# Patient Record
Sex: FEMALE | Race: WHITE
[De-identification: names, ages, dates, MRNs, and addresses within clinical notes are randomized per-mention and may not be internally consistent; named-entity substitution may affect disease eponyms.]

---

## 2017-06-12 ENCOUNTER — HOSPITAL ENCOUNTER (OUTPATIENT)
Dept: HOSPITAL 75 - RAD | Age: 47
End: 2017-06-12
Attending: NURSE PRACTITIONER
Payer: COMMERCIAL

## 2017-06-12 DIAGNOSIS — I10: Primary | ICD-10-CM

## 2017-06-14 ENCOUNTER — HOSPITAL ENCOUNTER (OUTPATIENT)
Dept: HOSPITAL 75 - RAD | Age: 47
End: 2017-06-14
Attending: NURSE PRACTITIONER
Payer: COMMERCIAL

## 2017-06-14 DIAGNOSIS — I10: Primary | ICD-10-CM

## 2017-06-14 PROCEDURE — 93975 VASCULAR STUDY: CPT

## 2017-06-18 ENCOUNTER — HOSPITAL ENCOUNTER (OUTPATIENT)
Dept: HOSPITAL 75 - ER | Age: 47
Setting detail: OBSERVATION
LOS: 2 days | Discharge: HOME | End: 2017-06-20
Attending: FAMILY MEDICINE | Admitting: FAMILY MEDICINE
Payer: COMMERCIAL

## 2017-06-18 VITALS — WEIGHT: 194 LBS | BODY MASS INDEX: 28.73 KG/M2 | HEIGHT: 69 IN

## 2017-06-18 VITALS — SYSTOLIC BLOOD PRESSURE: 138 MMHG | DIASTOLIC BLOOD PRESSURE: 80 MMHG

## 2017-06-18 VITALS — SYSTOLIC BLOOD PRESSURE: 166 MMHG | DIASTOLIC BLOOD PRESSURE: 77 MMHG

## 2017-06-18 VITALS — DIASTOLIC BLOOD PRESSURE: 79 MMHG | SYSTOLIC BLOOD PRESSURE: 158 MMHG

## 2017-06-18 VITALS — DIASTOLIC BLOOD PRESSURE: 71 MMHG | SYSTOLIC BLOOD PRESSURE: 136 MMHG
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
## 2017-06-18 DIAGNOSIS — S00.81XA: ICD-10-CM

## 2017-06-18 DIAGNOSIS — I10: ICD-10-CM

## 2017-06-18 DIAGNOSIS — S00.31XA: ICD-10-CM

## 2017-06-18 DIAGNOSIS — S01.512A: ICD-10-CM

## 2017-06-18 DIAGNOSIS — M79.7: ICD-10-CM

## 2017-06-18 DIAGNOSIS — R06.09: ICD-10-CM

## 2017-06-18 DIAGNOSIS — Z23: ICD-10-CM

## 2017-06-18 DIAGNOSIS — S13.4XXA: ICD-10-CM

## 2017-06-18 DIAGNOSIS — S00.512A: ICD-10-CM
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
## 2017-06-18 DIAGNOSIS — F17.210: ICD-10-CM

## 2017-06-18 DIAGNOSIS — R55: Primary | ICD-10-CM

## 2017-06-18 DIAGNOSIS — F41.9: ICD-10-CM

## 2017-06-18 LAB
ALBUMIN SERPL-MCNC: 4.4 GM/DL (ref 3.2–4.5)
ALT SERPL-CCNC: 12 U/L (ref 0–55)
ANION GAP SERPL CALC-SCNC: 14 MMOL/L (ref 5–14)
APTT BLD: 26 SEC (ref 24–35)
AST SERPL-CCNC: 20 U/L (ref 5–34)
BASOPHILS # BLD AUTO: 0 10^3/UL (ref 0–0.1)
BASOPHILS NFR BLD AUTO: 0 % (ref 0–10)
BILIRUB SERPL-MCNC: 0.5 MG/DL (ref 0.1–1)
BILIRUB UR QL STRIP: NEGATIVE
BUN SERPL-MCNC: 18 MG/DL (ref 7–18)
BUN/CREAT SERPL: 16 (ref 0–20)
CALCIUM SERPL-MCNC: 9.2 MG/DL (ref 8.5–10.1)
CHLORIDE SERPL-SCNC: 101 MMOL/L (ref 98–107)
CK SERPL-CCNC: 87 U/L (ref 29–168)
CO2 SERPL-SCNC: 22 MMOL/L (ref 21–32)
CREAT SERPL-MCNC: 1.11 MG/DL (ref 0.6–1.3)
EOSINOPHIL # BLD AUTO: 0.1 10^3/UL (ref 0–0.3)
EOSINOPHIL NFR BLD AUTO: 1 % (ref 0–10)
ERYTHROCYTE [DISTWIDTH] IN BLOOD BY AUTOMATED COUNT: 12.9 % (ref 10–14.5)
GFR SERPLBLD BASED ON 1.73 SQ M-ARVRAT: 52 ML/MIN
GLUCOSE SERPL-MCNC: 121 MG/DL (ref 70–105)
HEMOLYSIS: 12 (ref -100–29)
ICTERUS: 0.5 (ref -100–1.9)
INR PPP: 1 (ref 0.8–1.4)
KETONES UR QL STRIP: NEGATIVE
LEUKOCYTE ESTERASE UR QL STRIP: NEGATIVE
LIPEMIA: 1 (ref -100–49)
LYMPHOCYTES # BLD AUTO: 4.3 X 10^3 (ref 1–4)
LYMPHOCYTES NFR BLD AUTO: 47 % (ref 12–44)
MAGNESIUM SERPL-MCNC: 2.3 MG/DL (ref 1.8–2.4)
MCH RBC QN AUTO: 30 PG (ref 25–34)
MCHC RBC AUTO-ENTMCNC: 33 G/DL (ref 32–36)
MCV RBC AUTO: 90 FL (ref 80–99)
MONOCYTES # BLD AUTO: 0.6 X 10^3 (ref 0–1)
MONOCYTES NFR BLD AUTO: 7 % (ref 0–12)
NEUTROPHILS # BLD AUTO: 4.2 X 10^3 (ref 1.8–7.8)
NEUTROPHILS NFR BLD AUTO: 45 % (ref 42–75)
NITRITE UR QL STRIP: NEGATIVE
PH UR STRIP: 6.5 [PH] (ref 5–9)
PLATELET # BLD: 186 10^3/UL (ref 130–400)
PMV BLD AUTO: 12.7 FL (ref 7.4–10.4)
POTASSIUM SERPL-SCNC: 3.7 MMOL/L (ref 3.6–5)
PROT SERPL-MCNC: 7.2 GM/DL (ref 6.4–8.2)
PROT UR QL STRIP: (no result)
PROTHROMBIN TIME: 12.9 SEC (ref 12.2–14.7)
RBC # BLD AUTO: 4.63 10^6/UL (ref 4.35–5.85)
SODIUM SERPL-SCNC: 137 MMOL/L (ref 135–145)
SP GR UR STRIP: 1.01 (ref 1.02–1.02)
SQUAMOUS #/AREA URNS HPF: (no result) /HPF
TROPONIN I SERPL-MCNC: < 0.3 NG/ML (ref ?–0.3)
UROBILINOGEN UR-MCNC: NORMAL MG/DL
WBC # BLD AUTO: 9.3 10^3/UL (ref 4.3–11)
WBC #/AREA URNS HPF: (no result) /HPF

## 2017-06-18 PROCEDURE — 72128 CT CHEST SPINE W/O DYE: CPT

## 2017-06-18 PROCEDURE — 82553 CREATINE MB FRACTION: CPT

## 2017-06-18 PROCEDURE — 85025 COMPLETE CBC W/AUTO DIFF WBC: CPT

## 2017-06-18 PROCEDURE — 75635 CT ANGIO ABDOMINAL ARTERIES: CPT

## 2017-06-18 PROCEDURE — 83735 ASSAY OF MAGNESIUM: CPT

## 2017-06-18 PROCEDURE — 84443 ASSAY THYROID STIM HORMONE: CPT

## 2017-06-18 PROCEDURE — 93880 EXTRACRANIAL BILAT STUDY: CPT

## 2017-06-18 PROCEDURE — 93041 RHYTHM ECG TRACING: CPT

## 2017-06-18 PROCEDURE — 70450 CT HEAD/BRAIN W/O DYE: CPT

## 2017-06-18 PROCEDURE — 80053 COMPREHEN METABOLIC PANEL: CPT

## 2017-06-18 PROCEDURE — 72170 X-RAY EXAM OF PELVIS: CPT

## 2017-06-18 PROCEDURE — 93005 ELECTROCARDIOGRAM TRACING: CPT

## 2017-06-18 PROCEDURE — 90715 TDAP VACCINE 7 YRS/> IM: CPT

## 2017-06-18 PROCEDURE — 83874 ASSAY OF MYOGLOBIN: CPT

## 2017-06-18 PROCEDURE — 85730 THROMBOPLASTIN TIME PARTIAL: CPT

## 2017-06-18 PROCEDURE — 82550 ASSAY OF CK (CPK): CPT

## 2017-06-18 PROCEDURE — 84484 ASSAY OF TROPONIN QUANT: CPT

## 2017-06-18 PROCEDURE — 72131 CT LUMBAR SPINE W/O DYE: CPT

## 2017-06-18 PROCEDURE — 72125 CT NECK SPINE W/O DYE: CPT

## 2017-06-18 PROCEDURE — 93306 TTE W/DOPPLER COMPLETE: CPT

## 2017-06-18 PROCEDURE — 36415 COLL VENOUS BLD VENIPUNCTURE: CPT

## 2017-06-18 PROCEDURE — 81000 URINALYSIS NONAUTO W/SCOPE: CPT

## 2017-06-18 PROCEDURE — 71010: CPT

## 2017-06-18 PROCEDURE — 70486 CT MAXILLOFACIAL W/O DYE: CPT

## 2017-06-18 PROCEDURE — 83880 ASSAY OF NATRIURETIC PEPTIDE: CPT

## 2017-06-18 PROCEDURE — 85610 PROTHROMBIN TIME: CPT

## 2017-06-18 NOTE — DIAGNOSTIC IMAGING REPORT
INDICATION: Passed out, fell down. Pain



EXAMINATION: Chest 6/18/17



FINDINGS:



The osseous structures are overall somewhat sclerotic in

appearance but fairly homogeneous; correlate clinically. There

are no fractures appreciated. The lungs are clear. No infiltrates

or effusions are seen and there is no pneumothorax. Heart and

pulmonary vasculature unremarkable.



IMPRESSION:

1. No acute process.



Dictated by: 



  Dictated on workstation # [id number]

## 2017-06-18 NOTE — DIAGNOSTIC IMAGING REPORT
INDICATION: Fell today. No complaints of back pain



EXAMINATION: CT thoracic and lumbar spine dated 6/18/2017



FINDINGS:



There is normal height and alignment of the vertebral bodies

throughout the thoracic and lumbar spine. No subluxations

appreciated. The canal centrally appears to be patent although

limited on CT imaging. Several areas of linear lucencies noted

scattered throughout the posterior elements of the thoracic and

lumbar spine are felt to represent areas of prominent nutrient

foramina. No displaced fractures are appreciated.



Within the lower lumbar region fairly marked multilevel facet

hypertrophy noted. There is sclerosis bilaterally at the SI

joints. Multilevel likely central narrowing seen in the lower

lumbar region but does not appear significant. This could be

better characterized with MRI as clinically warranted. The

visualized intra-abdominal structures poorly characterized due to

the bone algorithm technique. Prominence of the left renal pelvic

region likely due to peripelvic cysts. The visualized lungs

demonstrate no evidence for acute disease.



IMPRESSION:

1. Multilevel degenerative findings but no acute fractures are

appreciated. If pain persists, MRI recommended. Other incidental

findings as noted above.



Dictated by: 



  Dictated on workstation # [id number]

## 2017-06-18 NOTE — DIAGNOSTIC IMAGING REPORT
INDICATION: Fell down stairs, pain. 



EXAMINATION: Pelvis, 6/18/2017.



FINDINGS: There is no evidence for an acute fracture or

dislocation. The joint spaces are well maintained. There is no

significant soft tissue swelling.



IMPRESSION: No acute process.



Dictated by: 



  Dictated on workstation # [id number]

## 2017-06-18 NOTE — DIAGNOSTIC IMAGING REPORT
PROCEDURE: CT head, face, and cervical spine without contrast.



TECHNIQUE: Multiple contiguous axial images were obtained through

the head, neck, and facial bones without the use of intravenous

contrast. Sagittal and coronal reformations through the cervical

spine and facial bones were also performed.



INDICATION:  Passed out and fell down stairs. Pain and laceration

to left chin and upper lip area.



Brain:



No hemorrhage or infarct is seen. No mass, mass effect or midline

shift is noted and there is no hydrocephalus. The calvarium is

intact. Paranasal sinuses demonstrate no evidence for acute

disease.



CT cervical spine:



There is straightening of the normal curvature, otherwise normal

alignment seen. No compression deformities appreciated. No

fractures visualized. The prevertebral soft tissues demonstrate

no evidence for acute disease. The lung apices are grossly

unremarkable.



IMPRESSION: 

1. Degenerative findings throughout the cervical spine but no

acute disease appreciated.

2. Negative head CT.



CT maxillofacial:



Diffuse mucosal thickening seen throughout the sinuses. No

air-fluid levels appreciated. No displaced fractures are seen.

Nasal bones are fairly symmetric bilaterally. Mild soft tissue

prominence overlying the chin on the left side likely known

injury and secondary hematoma. No adjacent fractures are

appreciated. No sagittal reconstructed imaging is provided.



IMPRESSION:

1. Chronic change within the osseous structures. Soft tissue

abnormality as noted above adjacent to the left aspect of the

chin but no acute fractures are appreciated.



Dictated by: 



  Dictated on workstation # [id number]

## 2017-06-19 VITALS — DIASTOLIC BLOOD PRESSURE: 88 MMHG | SYSTOLIC BLOOD PRESSURE: 136 MMHG

## 2017-06-19 VITALS — DIASTOLIC BLOOD PRESSURE: 77 MMHG | SYSTOLIC BLOOD PRESSURE: 117 MMHG

## 2017-06-19 VITALS — SYSTOLIC BLOOD PRESSURE: 130 MMHG | DIASTOLIC BLOOD PRESSURE: 75 MMHG

## 2017-06-19 VITALS — SYSTOLIC BLOOD PRESSURE: 147 MMHG | DIASTOLIC BLOOD PRESSURE: 81 MMHG

## 2017-06-19 VITALS — SYSTOLIC BLOOD PRESSURE: 127 MMHG | DIASTOLIC BLOOD PRESSURE: 70 MMHG

## 2017-06-19 VITALS — SYSTOLIC BLOOD PRESSURE: 121 MMHG | DIASTOLIC BLOOD PRESSURE: 69 MMHG

## 2017-06-19 VITALS — SYSTOLIC BLOOD PRESSURE: 148 MMHG | DIASTOLIC BLOOD PRESSURE: 84 MMHG

## 2017-06-19 VITALS — SYSTOLIC BLOOD PRESSURE: 149 MMHG | DIASTOLIC BLOOD PRESSURE: 70 MMHG

## 2017-06-19 VITALS — DIASTOLIC BLOOD PRESSURE: 82 MMHG | SYSTOLIC BLOOD PRESSURE: 156 MMHG

## 2017-06-19 VITALS — SYSTOLIC BLOOD PRESSURE: 147 MMHG | DIASTOLIC BLOOD PRESSURE: 73 MMHG

## 2017-06-19 VITALS — SYSTOLIC BLOOD PRESSURE: 117 MMHG | DIASTOLIC BLOOD PRESSURE: 76 MMHG

## 2017-06-19 VITALS — DIASTOLIC BLOOD PRESSURE: 76 MMHG | SYSTOLIC BLOOD PRESSURE: 146 MMHG

## 2017-06-19 VITALS — DIASTOLIC BLOOD PRESSURE: 57 MMHG | SYSTOLIC BLOOD PRESSURE: 125 MMHG

## 2017-06-19 VITALS — SYSTOLIC BLOOD PRESSURE: 159 MMHG | DIASTOLIC BLOOD PRESSURE: 74 MMHG

## 2017-06-19 VITALS — SYSTOLIC BLOOD PRESSURE: 120 MMHG | DIASTOLIC BLOOD PRESSURE: 72 MMHG

## 2017-06-19 VITALS — DIASTOLIC BLOOD PRESSURE: 71 MMHG | SYSTOLIC BLOOD PRESSURE: 145 MMHG

## 2017-06-19 VITALS — SYSTOLIC BLOOD PRESSURE: 146 MMHG | DIASTOLIC BLOOD PRESSURE: 81 MMHG

## 2017-06-19 LAB
ALBUMIN SERPL-MCNC: 4.2 GM/DL (ref 3.2–4.5)
ALT SERPL-CCNC: 11 U/L (ref 0–55)
ANION GAP SERPL CALC-SCNC: 11 MMOL/L (ref 5–14)
AST SERPL-CCNC: 17 U/L (ref 5–34)
BASOPHILS # BLD AUTO: 0 10^3/UL (ref 0–0.1)
BASOPHILS NFR BLD AUTO: 0 % (ref 0–10)
BILIRUB SERPL-MCNC: 0.6 MG/DL (ref 0.1–1)
BUN SERPL-MCNC: 14 MG/DL (ref 7–18)
BUN/CREAT SERPL: 15 (ref 0–20)
CALCIUM SERPL-MCNC: 9.1 MG/DL (ref 8.5–10.1)
CHLORIDE SERPL-SCNC: 104 MMOL/L (ref 98–107)
CO2 SERPL-SCNC: 23 MMOL/L (ref 21–32)
CREAT SERPL-MCNC: 0.94 MG/DL (ref 0.6–1.3)
EOSINOPHIL # BLD AUTO: 0.1 10^3/UL (ref 0–0.3)
EOSINOPHIL NFR BLD AUTO: 1 % (ref 0–10)
ERYTHROCYTE [DISTWIDTH] IN BLOOD BY AUTOMATED COUNT: 12.9 % (ref 10–14.5)
GFR SERPLBLD BASED ON 1.73 SQ M-ARVRAT: > 60 ML/MIN
GLUCOSE SERPL-MCNC: 95 MG/DL (ref 70–105)
HEMOLYSIS: 8 (ref -100–29)
ICTERUS: 0.6 (ref -100–1.9)
LIPEMIA: 3 (ref -100–49)
LYMPHOCYTES # BLD AUTO: 3.3 X 10^3 (ref 1–4)
LYMPHOCYTES NFR BLD AUTO: 35 % (ref 12–44)
MCH RBC QN AUTO: 29 PG (ref 25–34)
MCHC RBC AUTO-ENTMCNC: 33 G/DL (ref 32–36)
MCV RBC AUTO: 90 FL (ref 80–99)
MONOCYTES # BLD AUTO: 0.7 X 10^3 (ref 0–1)
MONOCYTES NFR BLD AUTO: 8 % (ref 0–12)
MYOGLOBIN SERPL-MCNC: 67.8 NG/ML (ref 10–92)
NEUTROPHILS # BLD AUTO: 5.4 X 10^3 (ref 1.8–7.8)
NEUTROPHILS NFR BLD AUTO: 56 % (ref 42–75)
PLATELET # BLD: 186 10^3/UL (ref 130–400)
PMV BLD AUTO: 12.3 FL (ref 7.4–10.4)
POTASSIUM SERPL-SCNC: 3.7 MMOL/L (ref 3.6–5)
PROT SERPL-MCNC: 6.8 GM/DL (ref 6.4–8.2)
RBC # BLD AUTO: 4.57 10^6/UL (ref 4.35–5.85)
SODIUM SERPL-SCNC: 138 MMOL/L (ref 135–145)
TROPONIN I SERPL-MCNC: < 0.3 NG/ML (ref ?–0.3)
WBC # BLD AUTO: 9.6 10^3/UL (ref 4.3–11)

## 2017-06-19 RX ADMIN — MUPIROCIN SCH GM: 20 OINTMENT TOPICAL at 14:42

## 2017-06-19 RX ADMIN — ACETAMINOPHEN PRN MG: 500 TABLET ORAL at 00:04

## 2017-06-19 RX ADMIN — ACETAMINOPHEN PRN MG: 500 TABLET ORAL at 14:42

## 2017-06-19 RX ADMIN — SULFAMETHOXAZOLE AND TRIMETHOPRIM SCH EA: 800; 160 TABLET ORAL at 17:58

## 2017-06-19 RX ADMIN — MUPIROCIN SCH GM: 20 OINTMENT TOPICAL at 20:31

## 2017-06-19 RX ADMIN — ACETAMINOPHEN PRN MG: 500 TABLET ORAL at 21:29

## 2017-06-19 RX ADMIN — SULFAMETHOXAZOLE AND TRIMETHOPRIM SCH EA: 800; 160 TABLET ORAL at 08:42

## 2017-06-19 NOTE — CONSULTATION-CARDIOLOGY
HPI-Cardiology


Cardiology Consultation:


Date of Consultation


17


Date of Admission


17


Attending Physician


Jaimee Linda DO


Admitting Physician


Jaimee Linda DO


Consulting Physician


Semaj Crow MD





HPI:


Chief Complaint:


Syncope


Ms. Sexton is a 46 year old female admitted to 403 from the ED.  She reports 

over the course of the last 2 weeks her blood pressure at home has been 

elevated.  She was in [place], [place] visiting a friend and took her blood pressure 

which she noted to be elevated at 190 systolic.  She reports she went to the ED 

at [facility] in [place] and was given Lisinopril/HCTZ 10/12.5mg and released.  

She reports her blood pressure was still running high and she was seen last 

week by her PCP here and her medication was increased to Lisinopril/HCTZ 

20.12.5mg.  She reports she has been feeling generally unwell for the last 

couple weeks and has progressively been feeling much worse the last 3-4 days.  

She reports decrease appetite, energy and difficulty focusing.  She states she 

has been feeling short of breath with strenuous exertion.  She reports 

yesterday she was doing laundry.  She walked up the stairs and began to feel 

"extremely fatigued"  she reports she sat down on the top step and the next 

thing she recalls she was at the bottom of the staircase.  She remembers 

calling for her .  He is at the bedside.  He states she was pale and 

awake at the bottom of the stairs.  He reports she kept trying to get up, but 

was unable to.  He reports she was having difficulty following conversation and 

was flailing.  He reports she finally crawled up the stairs and sat in a chair, 

but she would try to sit up in the chair and her eyes would roll back and she 

would fall back into the chair.  He states this went on for at least 5 minutes.

  She reports her blood pressure has been high during the day per her cuff, but 

by evening her blood pressure is dropping low.  She reports she has been having 

a lot of back pain.  She currently reports continued decrease appetite.  She 

reports some continued dizziness when up, but no further syncope or near 

syncope. 





Had an anti-htn regimen initiated only days prior to admission and had been 

feeling dizzy since; had also had low oral intake lately





Review of Systems-Cardiology


Review of Systems


Time Seen by Provider:  10:00


Constitutional:  As described under HPI


Eyes:  No blurred vision, No drainage, No pain, No vision change


Ears/Nose/Throat:  No ear discharge, No ear pain, No nasal drainage, No 

ulcerations


Respiratory:  As described under HPI


Cardiovascular:  As described under HPI


Gastrointestinal:  No constipation, No diarrhea, nausea, poor appetite, No 

vomiting, No stool coloration changes


Genitourinary:  No dysuria, No discharge, No frequency, No hematuria, No urgency


Pregnant:  No


Musculoskeletal:  back pain, muscle pain (chronic)


Skin:  No rash, No skin related problems, No ulcerations


Psychiatric/Neurological:  As described under HPI, No anxiety, No depression, 

No focal weakness, No seizure, No syncope


Hematologic:  No bleeding abnormalities





PMH-Social-Family Hx


Patient Social History


Alcohol Use:  Occasionally Uses


Recreational Drug Use:  No


Smoking Status:  Current Everyday Smoker


Type Used:  Cigarettes


Recent Foreign Travel:  No


Recent Infectious Disease Expo:  No


Physical Abuse Screen:  No


Sexual Abuse:  No





Past Medical History


PMH


As described under Assessment.





Family Medical History


Family Medical History:  


She reports her father has diabetes and hypertension.  She reports her


mother has CHF.  She does not report any premature CAD or SCD.


Family History:  


19 FATHER


  Hypertension


  Diabetes mellitus





Allergies and Home Medications


Allergies


Coded Allergies:  


     No Known Drug Allergies (Unverified , 17)





Home Medications


Dextroamphetamine/Amphetamine 10 Mg Cap.er.24h, 10 MG PO DAILY PRN for 

CONCENTRATION/FIBROMYALGIA, (Reported)


Duloxetine HCl 60 Mg Capsule.dr, 60 MG PO HS, (Reported)


Lisinopril/Hydrochlorothiazide 1 Each Tablet, 1 TAB PO DAILY, (Reported)





Physical Exam-Cardiology


Physical Exam


Vital Signs/I&O





Vital Sign - Last 12Hours








 17





 07:00 07:22 08:00 11:38


 


Temp 98.3   


 


Pulse 70 64  


 


Resp 18   


 


B/P (MAP) 121/69   149/76 (100)


 


Pulse Ox 96   


 


O2 Delivery Room Air  Room Air 


 


    





 17





 11:38 11:38 11:39 13:00


 


Temp   98.9 


 


Pulse   60 


 


Resp   14 


 


B/P (MAP) 117/77 (90) 140/71 (94) 117/77 


 


Pulse Ox   96 


 


O2 Delivery   Room Air Room Air


 


    





 17  





 13:21 15:56  


 


Temp  98.6  


 


Pulse 66 59  


 


Resp  16  


 


B/P (MAP)  159/74  


 


Pulse Ox  94  


 


O2 Delivery  Room Air  





Capillary Refill : NONELess Than 3 Seconds


Constitutional:  AAO x 3


HEENT:  PERRL, No discharge, hearing is well preserved, oral hygience is good, 

No ulceration, No xanthelasmas are seen


Neck:  No carotid bruit, carotid pulses are 2 + bilaterally


Respiratory:  No accessory muscle use, No respiratory distress, chest expansion 

is symmetric, chest is bilaterally symmetric, lungs clear to auscultation


Cardiovascular:  regular rate-rhythm, No JVD, S1 and S2


Gastrointestinal:  No tender, soft, audible bowel sounds


Rectal:  deferred


Extremities:  No significant edema


Neurologic/Psychiatric:  grossly intact


Skin:  other (abrasions to left side of her note and upper lip)





Data Review


Labs


Laboratory Tests


17 19:40: 


White Blood Count 9.3, Red Blood Count 4.63, Hemoglobin 13.8, Hematocrit 42, 

Mean Corpuscular Volume 90, Mean Corpuscular Hemoglobin 30, Mean Corpuscular 

Hemoglobin Concent 33, Red Cell Distribution Width 12.9, Platelet Count 186, 

Mean Platelet Volume 12.7H, Neutrophils (%) (Auto) 45, Lymphocytes (%) (Auto) 

47H, Monocytes (%) (Auto) 7, Eosinophils (%) (Auto) 1, Basophils (%) (Auto) 0, 

Neutrophils # (Auto) 4.2, Lymphocytes # (Auto) 4.3H, Monocytes # (Auto) 0.6, 

Eosinophils # (Auto) 0.1, Basophils # (Auto) 0.0, Prothrombin Time 12.9, INR 

Comment 1.0, Activated Partial Thromboplast Time 26, Sodium Level 137, 

Potassium Level 3.7, Chloride Level 101, Carbon Dioxide Level 22, Anion Gap 14, 

Blood Urea Nitrogen 18, Creatinine 1.11, Estimat Glomerular Filtration Rate 52, 

BUN/Creatinine Ratio 16, Glucose Level 121H, Calcium Level 9.2, Magnesium Level 

2.3, Total Bilirubin 0.5, Aspartate Amino Transf (AST/SGOT) 20, Alanine 

Aminotransferase (ALT/SGPT) 12, Alkaline Phosphatase 47, Total Creatine Kinase 

87, Creatine Kinase MB 0.7, Troponin I < 0.30, B-Type Natriuretic Peptide < 10.0

, Total Protein 7.2, Albumin 4.4, TSH Cascade Testing 1.72


17 21:50: 


Urine Color YELLOW, Urine Clarity CLEAR, Urine pH 6.5, Urine Specific Gravity 

1.010L, Urine Protein 1+H, Urine Glucose (UA) NEGATIVE, Urine Ketones NEGATIVE, 

Urine Nitrite NEGATIVE, Urine Bilirubin NEGATIVE, Urine Urobilinogen NORMAL, 

Urine Leukocyte Esterase NEGATIVE, Urine RBC (Auto) 2+H, Urine RBC NONE, Urine 

WBC NONE, Urine Squamous Epithelial Cells RARE, Urine Crystals NONE, Urine 

Bacteria TRACE, Urine Casts NONE, Urine Mucus NEGATIVE, Urine Culture Indicated 

NO


17 01:58: 


Troponin I < 0.30, Myoglobin 67.8


17 06:27: 


White Blood Count 9.6, Red Blood Count 4.57, Hemoglobin 13.4, Hematocrit 41, 

Mean Corpuscular Volume 90, Mean Corpuscular Hemoglobin 29, Mean Corpuscular 

Hemoglobin Concent 33, Red Cell Distribution Width 12.9, Platelet Count 186, 

Mean Platelet Volume 12.3H, Neutrophils (%) (Auto) 56, Lymphocytes (%) (Auto) 35

, Monocytes (%) (Auto) 8, Eosinophils (%) (Auto) 1, Basophils (%) (Auto) 0, 

Neutrophils # (Auto) 5.4, Lymphocytes # (Auto) 3.3, Monocytes # (Auto) 0.7, 

Eosinophils # (Auto) 0.1, Basophils # (Auto) 0.0, Sodium Level 138, Potassium 

Level 3.7, Chloride Level 104, Carbon Dioxide Level 23, Anion Gap 11, Blood 

Urea Nitrogen 14, Creatinine 0.94, Estimat Glomerular Filtration Rate > 60, BUN/

Creatinine Ratio 15, Glucose Level 95, Calcium Level 9.1, Total Bilirubin 0.6, 

Aspartate Amino Transf (AST/SGOT) 17, Alanine Aminotransferase (ALT/SGPT) 11, 

Alkaline Phosphatase 44, Total Protein 6.8, Albumin 4.2








Radiology


NAME:   PATRICK SEXTON


MED REC#:   [id number]


ACCOUNT#:   [id number]


PT STATUS:   ADM Nikky


:   1970


PHYSICIAN:   LIZETTE LEDESMA DO


ADMIT DATE:   


   ***Draft***


Date of Exam:17





CHEST 1 VIEW, AP/PA ONLY








INDICATION: Passed out, fell down. Pain





EXAMINATION: Chest 17





FINDINGS:





The osseous structures are overall somewhat sclerotic in


appearance but fairly homogeneous; correlate clinically. There


are no fractures appreciated. The lungs are clear. No infiltrates


or effusions are seen and there is no pneumothorax. Heart and


pulmonary vasculature unremarkable.





IMPRESSION:


1. No acute process.





  Dictated on workstation # [id number]








Dict:   17


Trans:   17


JUSTIN [id number]





Interpreted by:     RICHARDSON BARNES MD


Electronically signed by:  


NAME:   PATRICK SEXTON


MED REC#:   [id number]


ACCOUNT#:   [id number]


PT STATUS:   ADM Nikky


:   1970


PHYSICIAN:   CASSIUS MURRAY DO


ADMIT DATE:   


   ***Draft***


Date of Exam:17





US CAROTID NERI COMPLETE [id number]








PROCEDURE: 


US Carotid Duplex Bilateral.





TECHNIQUE: 


Multiple real-time grayscale images were obtained over the


carotid arteries in various projections bilaterally. Additional


duplex Doppler and color Doppler images were also obtained.





INDICATION: 


Syncope.





FINDINGS:


There is no atherosclerotic plaque seen at either carotid


bifurcation. The velocities and waveforms appear normal. Both


vertebral arteries are patent with antegrade flow. 





IMPRESSION: 


Negative carotid Doppler.








  Dictated on workstation # [id number]








Dict:   17 [id number]


Trans:   17 0921


 [id number]





Interpreted by:     WILLA MERCADO


Electronically signed by:





ECG Impression


ECG


Initial ECG Rhythm:  Normal Sinus





A/P-Cardiology


Assessment/Admission Diagnosis








Syncopal episode of undetermined etiology - possible orthostatic hypotension, 

but other etiologies need considered, especially arrhythmic etiologies





HTN





Exertional dyspnea of undetermined etiology





The renal arteries are largely obscured. There is diminished amplitude of the 

arterial waveforms in the right kidney which could correlate with renal artery 

stenosis. Evaluation with CTA or MRA of the abdomen is recommended. Per u/s of 





Fibromyalgia





Normal TSH on 17





Negative carotid duplex of 17





Discussion and Recomendations


Syncopal episode of undetermined etiology.  Possibly d/t orthostatic 

hypotension d/t medications.  We will stop antihypertensive.  Check ortho v/s.  

Ambulate in halls.  Continue tele to evaluate for possible arrhythmia or 

bradycardia.  If none found this admission then may need outpt cardiac monitor.

  Continue to monitor lab.  Further recommendations will be based on her 

hospital course.  We would like to thank the medical services for this consult.





This consult is being scribed by BIJU Lux on behalf of Dr. Crow after 

discussion regarding plan of care.





Clinical Quality Measures


DVT/VTE Risk/Contraindication:


Risk Factor Score Per Nursing:  3


RFS Level Per Nursing on Admit:  3=High





Physician Assessment


Physician Assessment


Lungs: clear


Cor: reg





A&R


* As documented in our note above that I updated at the time of this writing (

italics)


* Echo to eval for structural heart disease


* Exercise stress echo to eval for ischemic heart disease or exercise-induced 

arrhythmia


* If above studies are negative, then consider placement of implantable loop 

recorder to monitor for arrhythmia


* Hold off on anti-htn regimen for now


* Renal artery u/s was inconclusive. She may have renal artery stenosis as the 

basis for hypertension, but hypertension would not account for syncope (hypo

tension would); accordingly, further investigation for BIPIN can be undertaken as 

an outpatient (CT angiography of renal arteries)


* I spoke with her and her family in detail and answered questions











SAMMIE HANSEN 2017 09:46


[facility]J CROW [facility] 2017 18:15

## 2017-06-19 NOTE — HISTORY & PHYSICIAL
History of Present Illness


History of Present Illness


Reason for visit/HPI


This is a 46 year old female with a history of recent hypertension.  She has 

been taking lisinopril HCT with her blood pressure running higher during the 

day and then dropping in the evenings.  She has had some nausea and dizziness.  

She was coming up the stairs at home and had a syncopal episode falling back 

down the stairs and striking her neck, head and face.  She was brought to the 

emergency room where she was found to have facial contusions and abrasions.  CT 

scan of the head and cervical spine were negative.  Her blood pressure was 

stable at 144/74.  However, it was decided to admit her for further monitoring 

and observation.


Date of Admission


Jun 18, 2017 at 21:15


Time Seen by Provider:  08:35


I consulted on this patient on


6/19/17


 18:12


Attending Physician


Jaimee Linda DO


Admitting Physician


Jaimee Linda DO


Consult








Allergies and Home Medications


Allergies


Coded Allergies:  


     No Known Drug Allergies (Unverified , 6/18/17)





Home Medications


Dextroamphetamine/Amphetamine 10 Mg Cap.er.24h, 10 MG PO DAILY PRN for 

CONCENTRATION/FIBROMYALGIA, (Reported)


Duloxetine HCl 60 Mg Capsule.dr, 60 MG PO HS, (Reported)


Lisinopril/Hydrochlorothiazide 1 Each Tablet, 1 TAB PO DAILY, (Reported)





Past Medical-Social-Family Hx


Patient Social History


Alcohol Use:  Occasionally Uses


Recreational Drug Use:  No


Smoking Status:  Current Everyday Smoker


Type Used:  Cigarettes


Physical Abuse Screen:  No


Sexual Abuse:  No


Recent Foreign Travel:  No


Contact w/other who traveled:  No


Recent Hopitalizations:  No


Recent Infectious Disease Expo:  No





Seasonal Allergies


Seasonal Allergies:  No





Surgeries


HX Surgeries:  No





Respiratory


Hx Respiratory Disorders:  No





Cardiovascular


Hx Cardiovascular Disorders:  Yes (NEW DX 06/2017)


Cardiac Disorders:  Hypertension





Neurological


Hx Neurological Disorders:  No





Reproductive System


Pregnant:  No


HIV/AIDS:  No


Female Reproductive Disorders:  Denies





Genitourinary


Hx Genitourinary Disorders:  No





Gastrointestinal


Hx Gastrointestinal Disorders:  No





Musculoskeletal


Hx Musculoskeletal Disorders:  Yes


Musculoskeletal Disorders:  Fibromyalgia





Endocrine


Hx Endocrine Disorders:  No





HEENT


HX ENT Disorders:  No





Cancer


Hx Cancer:  Yes


Cancer:  Skin





Psychosocial


Hx Psychiatric Problems:  Yes


Behavioral Health Disorders:  ADD/ADHD, Anxiety





Integumentary


HX Skin/Integumentary Disorder:  No





Blood Transfusions


Adverse Reaction to a Blood Tr:  No





Family Medical History


Family Hx:  


Diabetes mellitus


  19 FATHER


Hypertension


  19 FATHER





Constitutional:  dizziness, weakness


EENTM:  mouth pain, mouth swelling, nose pain (from pain)


Respiratory:  No no symptoms reported, No see HPI, No cough, No dyspnea on 

exertion, No hemoptysis, No orthopnea, No phlegm, No short of breath, No stridor

, No wheezing, No other


Cardiovascular:  syncope


Gastrointestinal:  No RUQ, No LUQ, No RLQ, No LLQ, No no symptoms reported, No 

see HPI, No abdominal pain, No constipation, No diarrhea, No dysphagia, No 

hematemesis, No heartburn, No jaundice, No loss of appetite, No melena, No 

nausea, No vomiting, No other


Genitourinary:  No no symptoms reported, No see HPI, No decreased output, No 

discharge, No dysuria, No frequency, No hematuria, No hesitancy, No incontinence

, No nocturia, No pain, No other


Musculoskeletal:  neck pain


Skin:  other (facial abrasions)


Psychiatric/Neurological:  Weakness





Physical Exam


Vital Signs





Vital Sign - Last 12Hours




















Capillary Refill : NONELess Than 3 Seconds


General Appearance:  Mild Distress (due to pain in face)


Eyes:  Bilateral Eye EOMI, Bilateral Eye PERRL


HEENT:  Other (upper lip and supraorbital area with swelling and abrasion)


Neck:  Full Range of Motion, Non Tender, Supple


Respiratory:  Chest Non Tender, Lungs Clear, No Accessory Muscle Use, No 

Respiratory Distress


Cardiovascular:  Regular Rate, Rhythm, Systolic Murmur


Gastrointestinal:  Normal Bowel Sounds, Non Tender, Soft


Rectal:  Deferred


Back:  No CVA Tenderness


Extremity:  Non Tender, No Calf Tenderness, No Pedal Edema


Neurologic/Psychiatric:  Alert, Oriented x3, Motor Weakness (generalized)


Skin:  Other (facial swelling and abrasions)


Comments


Laboratory Tests


6/18/17 19:40: 


White Blood Count 9.3, Red Blood Count 4.63, Hemoglobin 13.8, Hematocrit 42, 

Mean Corpuscular Volume 90, Mean Corpuscular Hemoglobin 30, Mean Corpuscular 

Hemoglobin Concent 33, Red Cell Distribution Width 12.9, Platelet Count 186, 

Mean Platelet Volume 12.7H, Neutrophils (%) (Auto) 45, Lymphocytes (%) (Auto) 

47H, Monocytes (%) (Auto) 7, Eosinophils (%) (Auto) 1, Basophils (%) (Auto) 0, 

Neutrophils # (Auto) 4.2, Lymphocytes # (Auto) 4.3H, Monocytes # (Auto) 0.6, 

Eosinophils # (Auto) 0.1, Basophils # (Auto) 0.0, Prothrombin Time 12.9, INR 

Comment 1.0, Activated Partial Thromboplast Time 26, Sodium Level 137, 

Potassium Level 3.7, Chloride Level 101, Carbon Dioxide Level 22, Anion Gap 14, 

Blood Urea Nitrogen 18, Creatinine 1.11, Estimat Glomerular Filtration Rate 52, 

BUN/Creatinine Ratio 16, Glucose Level 121H, Calcium Level 9.2, Magnesium Level 

2.3, Total Bilirubin 0.5, Aspartate Amino Transf (AST/SGOT) 20, Alanine 

Aminotransferase (ALT/SGPT) 12, Alkaline Phosphatase 47, Total Creatine Kinase 

87, Creatine Kinase MB 0.7, Troponin I < 0.30, B-Type Natriuretic Peptide < 10.0

, Total Protein 7.2, Albumin 4.4, TSH Cascade Testing 1.72


6/18/17 21:50: 


Urine Color YELLOW, Urine Clarity CLEAR, Urine pH 6.5, Urine Specific Gravity 

1.010L, Urine Protein 1+H, Urine Glucose (UA) NEGATIVE, Urine Ketones NEGATIVE, 

Urine Nitrite NEGATIVE, Urine Bilirubin NEGATIVE, Urine Urobilinogen NORMAL, 

Urine Leukocyte Esterase NEGATIVE, Urine RBC (Auto) 2+H, Urine RBC NONE, Urine 

WBC NONE, Urine Squamous Epithelial Cells RARE, Urine Crystals NONE, Urine 

Bacteria TRACE, Urine Casts NONE, Urine Mucus NEGATIVE, Urine Culture Indicated 

NO


6/19/17 01:58: 


Troponin I < 0.30, Myoglobin 67.8


6/19/17 06:27: 


White Blood Count 9.6, Red Blood Count 4.57, Hemoglobin 13.4, Hematocrit 41, 

Mean Corpuscular Volume 90, Mean Corpuscular Hemoglobin 29, Mean Corpuscular 

Hemoglobin Concent 33, Red Cell Distribution Width 12.9, Platelet Count 186, 

Mean Platelet Volume 12.3H, Neutrophils (%) (Auto) 56, Lymphocytes (%) (Auto) 35

, Monocytes (%) (Auto) 8, Eosinophils (%) (Auto) 1, Basophils (%) (Auto) 0, 

Neutrophils # (Auto) 5.4, Lymphocytes # (Auto) 3.3, Monocytes # (Auto) 0.7, 

Eosinophils # (Auto) 0.1, Basophils # (Auto) 0.0, Sodium Level 138, Potassium 

Level 3.7, Chloride Level 104, Carbon Dioxide Level 23, Anion Gap 11, Blood 

Urea Nitrogen 14, Creatinine 0.94, Estimat Glomerular Filtration Rate > 60, BUN/

Creatinine Ratio 15, Glucose Level 95, Calcium Level 9.1, Total Bilirubin 0.6, 

Aspartate Amino Transf (AST/SGOT) 17, Alanine Aminotransferase (ALT/SGPT) 11, 

Alkaline Phosphatase 44, Total Protein 6.8, Albumin 4.2





Assessment/Plan


Assessment and Plan


1.  Syncopal Episode with Head, Neck and Facial Trauma--admit and check neuros, 

monitor on telemetry for arrhythmia, orthostatic blood pressures, check 2-D 

ECHO and Carotid Dopplers, consult cardiology


2.  Hypertension--hold home meds and monitor BP


3.  Anxiety--restart Cymbalta


Problems:  





Clinical Quality Measures


DVT/VTE Risk/Contraindication:


Risk Factor Score Per Nursing:  3


RFS Level Per Nursing on Admit:  3=High











JAIMEE LINDA DO Jun 19, 2017 18:18

## 2017-06-19 NOTE — ED GENERAL
General


Chief Complaint:  Trauma-Non Activation


Stated Complaint:  SYNCOPE,CLOSED HEAD INJ,FACIAL ABRASIONS


Nursing Triage Note:  


Pt. became dizzy while walking and fell down her basement stairs.


Nursing Sepsis Screen:  No Definite Risk


Source of Information:  Patient, EMS





History of Present Illness


Time Seen by Provider:  19:37


Initial Comments


PT ARRIVES VIA EMS FROM HOME--IN C-COLLAR


PT WAS WALKING UP BASEMENT STAIRS AND PASSED OUT-- FOUND HER AT THE 

BOTTOM OF THE STAIRS


DURATION OF LOSS OF CONSCIOUSNESS IS UNKNOWN


PT WAS ABLE TO STAND WITH 'S ASSIST, AND NO PAIN IN LEGS OR ARMS


NO BACK PAIN


NO HEADACHE


NO VISION CHANGES


NO CHEST PAIN


NO SHORTNESS OF BREATH


NO PALPITATIONS


EMS REPORT THAT PT DID C/O NECK PAIN WHEN THEY PALPATED IT, SO C-COLLAR WAS 

PLACED. 


NO PARESTHESIAS OR MOTOR DEFICITS


PT HAS ABRASIONS TO NOSE, MOUTH AND CHIN


PT C/O BEING VERY WEAK, DIZZY AND NAUSEATED


ACCUCHECK 133 BY EMS


VITALS WERE NORMAL FOR EMS--/65, HR 72, O2 SAT 96% ON ROOM AIR





PT DOES NOT HAVE A PRIOR HISTORY OF SYNCOPE





PT HAS BEEN TO [facility] X 2 IN THE LAST WEEK FOR NEW ONSET OF HTN--STARTED ON 

LISINOPRIL


STATES BP HAS BEEN GOING UP REALLY HIGH--UP 'S, AND THEN DROPPING REAL 

LOW.


PT SAW DR. CASTRO IN FOLLOW UP  AND RENAL ULTRASOUND WAS ORDERED


PT STATES  SHE HAS BEEN FEELING VERY WEAK ALL DAY TODAY AND FOR SEVERAL DAYS


PT HAS HAD MUCH FATIGUE WITH MINIMAL EXERTION FOR SEVERAL WEEKS





Allergies and Home Medications


Allergies


Coded Allergies:  


     No Known Drug Allergies (Unverified , 6/18/17)





Constitutional:  No chills, No diaphoresis, dizziness, malaise, weakness


EENTM:  other (ABRASIONS TO NOSE, UPPER LIP AND CHIN, WITH LACERATION TO INNER 

ASPECT OF LEFT UPPER LIP)


Respiratory:  no symptoms reported


Cardiovascular:  see HPI, No chest pain, No edema, No Hx of Intervention, No 

palpitations, syncope, No vascular heart diseas


Gastrointestinal:  see HPI, nausea, No vomiting


Genitourinary:  no symptoms reported


Pregnant:  No


Musculoskeletal:  see HPI, neck pain, other (FACIAL PAIN)


Skin:  see HPI, other (FACIAL ABRASIONS)


Psychiatric/Neurological:  See HPI, Denies Headache, Denies Numbness, Denies 

Paresthesia, Denies Seizure, Other (SYNCOPE, GENERALIZED WEAKNESS)


Hematologic/Lymphatic:  No Symptoms Reported


Immunological/Allergic:  no symptoms reported





Past Medical-Social-Family Hx


Patient Social History


Alcohol Use:  Occasionally Uses


Recreational Drug Use:  No


Smoking Status:  Current Everyday Smoker


Type Used:  Cigarettes


Recent Foreign Travel:  No


Contact w/Someone Who Travel:  No


Recent Infectious Disease Expo:  No


Recent Hopitalizations:  No


Physical Abuse Screen:  No


Sexual Abuse:  No





Immunizations Up To Date


PED Vaccines UTD:  No





Seasonal Allergies


Seasonal Allergies:  No





Surgeries


HX Surgeries:  No





Respiratory


Hx Respiratory Disorders:  No





Cardiovascular


Hx Cardiac Disorders:  Yes (NEW DX 06/2017)


Cardiac Disorders:  Hypertension





Neurological


Hx Neurological Disorders:  No





Reproductive System


Pregnant:  No


HIV/AIDS:  No


Female Reproductive Disorders:  Denies





Genitourinary


Hx Genitourinary Disorders:  No





Gastrointestinal


Hx Gastrointestinal Disorders:  No





Musculoskeletal


Hx Musculoskeletal Disorders:  Yes


Musculoskeletal Disorders:  Fibromyalgia





Endocrine


Hx Endocrine Disorders:  No





HEENT


HX ENT Disorders:  No





Cancer


Hx Cancer:  Yes


Cancer:  Skin





Psychosocial


Hx Psychiatric Problems:  Yes


Behavioral Health Disorders:  ADD/ADHD, Anxiety





Integumentary


HX Skin/Integumentary Disorder:  No





Blood Transfusions


Adverse Reaction to a Blood Tr:  No





Family Medical History


Family Medial History:  


Diabetes mellitus


  19 FATHER


Hypertension


  19 FATHER





Physical Exam


Vital Signs





Vital Sign - Last 12Hours




















Capillary Refill : NONELess Than 3 Seconds


General Appearance:  No Apparent Distress, WD/WN


HEENT:  PERRL/EOMI, TMs Normal, Other (ABRASIONS TO NASOLABIAL AREA, UPPER LIP 

AND CHIN. LACERATION TO INNER ASPECT OF LEFT UPPER LIP. NO DENTAL INJURY. NO 

JAW PAIN OR TRISMUS. )


Neck:  Other (IN CERVICAL COLLAR ON ARRIVAL)


Respiratory:  Chest Non Tender, Normal Breath Sounds, No Accessory Muscle Use, 

No Respiratory Distress


Cardiovascular:  Regular Rate, Rhythm, No Edema, No JVD, No Murmur, Normal 

Peripheral Pulses


Gastrointestinal:  Normal Bowel Sounds, No Organomegaly, No Pulsatile Mass, Non 

Tender, Soft


Back:  Normal Inspection, No CVA Tenderness, No Vertebral Tenderness


Extremity:  Normal Capillary Refill, Normal Inspection, Normal Range of Motion, 

Non Tender, No Calf Tenderness, No Pedal Edema


Neurologic/Psychiatric:  Alert, Oriented x3, No Motor/Sensory Deficits, Normal 

Mood/Affect, CNs II-XII Norm as Tested


Skin:  Normal Color, Warm/Dry, Other (ABRASIONS AS NOTED ABOVE)





Progress/Results/Core Measures


Results/Orders


Lab Results





Laboratory Tests








Test


  6/18/17


19:40 6/18/17


21:50 6/19/17


01:58 Range/Units


 


 


White Blood Count


  9.3 


  


  


  4.3-11.0


10^3/uL


 


Red Blood Count


  4.63 


  


  


  4.35-5.85


10^6/uL


 


Hemoglobin 13.8    11.5-16.0  G/DL


 


Hematocrit 42    35-52  %


 


Mean Corpuscular Volume 90    80-99  FL


 


Mean Corpuscular Hemoglobin 30    25-34  PG


 


Mean Corpuscular Hemoglobin


Concent 33 


  


  


  32-36  G/DL


 


 


Red Cell Distribution Width 12.9    10.0-14.5  %


 


Platelet Count


  186 


  


  


  130-400


10^3/uL


 


Mean Platelet Volume 12.7 H   7.4-10.4  FL


 


Neutrophils (%) (Auto) 45    42-75  %


 


Lymphocytes (%) (Auto) 47 H   12-44  %


 


Monocytes (%) (Auto) 7    0-12  %


 


Eosinophils (%) (Auto) 1    0-10  %


 


Basophils (%) (Auto) 0    0-10  %


 


Neutrophils # (Auto) 4.2    1.8-7.8  X 10^3


 


Lymphocytes # (Auto) 4.3 H   1.0-4.0  X 10^3


 


Monocytes # (Auto) 0.6    0.0-1.0  X 10^3


 


Eosinophils # (Auto)


  0.1 


  


  


  0.0-0.3


10^3/uL


 


Basophils # (Auto)


  0.0 


  


  


  0.0-0.1


10^3/uL


 


Prothrombin Time 12.9    12.2-14.7  SEC


 


INR Comment 1.0    0.8-1.4  


 


Activated Partial


Thromboplast Time 26 


  


  


  24-35  SEC


 


 


Sodium Level 137    135-145  MMOL/L


 


Potassium Level 3.7    3.6-5.0  MMOL/L


 


Chloride Level 101      MMOL/L


 


Carbon Dioxide Level 22    21-32  MMOL/L


 


Anion Gap 14    5-14  MMOL/L


 


Blood Urea Nitrogen 18    7-18  MG/DL


 


Creatinine


  1.11 


  


  


  0.60-1.30


MG/DL


 


Estimat Glomerular Filtration


Rate 52 


  


  


   


 


 


BUN/Creatinine Ratio 16    0-20  


 


Glucose Level 121 H     MG/DL


 


Calcium Level 9.2    8.5-10.1  MG/DL


 


Magnesium Level 2.3    1.8-2.4  MG/DL


 


Total Bilirubin 0.5    0.1-1.0  MG/DL


 


Aspartate Amino Transf


(AST/SGOT) 20 


  


  


  5-34  U/L


 


 


Alanine Aminotransferase


(ALT/SGPT) 12 


  


  


  0-55  U/L


 


 


Alkaline Phosphatase 47      U/L


 


Total Creatine Kinase 87      U/L


 


Creatine Kinase MB 0.7    <6.6  NG/ML


 


Troponin I < 0.30   < 0.30  <0.30  NG/ML


 


B-Type Natriuretic Peptide < 10.0    <100.0  PG/ML


 


Total Protein 7.2    6.4-8.2  GM/DL


 


Albumin 4.4    3.2-4.5  GM/DL


 


TSH Cascade Testing


  1.72 


  


  


  0.35-4.94


UIU/ML


 


Urine Color  YELLOW    


 


Urine Clarity  CLEAR    


 


Urine pH  6.5   5-9  


 


Urine Specific Gravity  1.010 L  1.016-1.022  


 


Urine Protein  1+ H  NEGATIVE  


 


Urine Glucose (UA)  NEGATIVE   NEGATIVE  


 


Urine Ketones  NEGATIVE   NEGATIVE  


 


Urine Nitrite  NEGATIVE   NEGATIVE  


 


Urine Bilirubin  NEGATIVE   NEGATIVE  


 


Urine Urobilinogen  NORMAL   NORMAL  MG/DL


 


Urine Leukocyte Esterase  NEGATIVE   NEGATIVE  


 


Urine RBC (Auto)  2+ H  NEGATIVE  


 


Urine RBC  NONE    /HPF


 


Urine WBC  NONE    /HPF


 


Urine Squamous Epithelial


Cells 


  RARE 


  


   /HPF


 


 


Urine Crystals  NONE    /LPF


 


Urine Bacteria  TRACE    /HPF


 


Urine Casts  NONE    /LPF


 


Urine Mucus  NEGATIVE    /LPF


 


Urine Culture Indicated  NO    


 


Myoglobin


  


  


  67.8 


  10.0-92.0


NG/ML








My Orders





Orders - LIZETTE LEDESMA DO


Saline Lock/Iv-Start (6/18/17 19:43)


Ekg Tracing (6/18/17 19:43)


Monitor-Rhythm Ecg Trace Only (6/18/17 19:43)


Ct Head/Face/Cervical Wo (6/18/17 19:43)


Ct Thoracic/Lumbar Spine Wo (6/18/17 19:43)


BNP (6/18/17 19:43)


Cbc With Automated Diff (6/18/17 19:43)


Comprehensive Metabolic Panel (6/18/17 19:43)


Creatine Kinase (6/18/17 19:43)


Creatine Kinase Mb (6/18/17 19:43)


Magnesium (6/18/17 19:43)


Protime With Inr (6/18/17 19:43)


Partial Thromboplastin Time (6/18/17 19:43)


Thyroid Analyzer (6/18/17 19:43)


Troponin I (6/18/17 19:43)


Ua Culture If Indicated (6/18/17 19:43)


Chest 1 View, Ap/Pa Only (6/18/17 19:43)


Pelvis (6/18/17 19:43)


Dipht,Pertuss(Acell),Tet Adult (Boostrix (6/18/17 19:45)


Mupirocin  Ointment (Bactroban  Ointment (6/19/17 09:00)


Wound Dressing-Ed (6/18/17 21:27)





Medications Given in ED





Current Medications








 Medications  Dose


 Ordered  Sig/Anthony


 Route  Start Time


 Stop Time Status Last Admin


Dose Admin


 


 Diphtheria/


 Tetanus/Acell


 Pertussis  0.5 ml  ONCE ONCE


 IM  6/18/17 19:45


 6/18/17 19:46 DC 6/18/17 21:25


0.5 ML








Vital Signs/I&O





Vital Sign - Last 12Hours








 6/18/17 6/18/17 6/18/17 6/18/17





 19:49 19:49 22:03 23:45


 


Temp 98.4 98.7  


 


Pulse 74 74 86 56


 


Resp 14 14 14 


 


B/P (MAP) 144/74 144/74 (97)  


 


Pulse Ox 98 98 98 


 


O2 Delivery Room Air Room Air Room Air 


 


    





 6/19/17 6/19/17 6/19/17 6/19/17





 00:00 00:00 00:00 01:00


 


Temp   98.4 


 


Pulse   56 64


 


Resp   16 


 


B/P (MAP)   146/81 


 


Pulse Ox 97  96 


 


O2 Delivery Room Air Room Air Room Air 


 


    





 6/19/17   





 03:54   


 


Temp 98.3   


 


Pulse 75   


 


Resp 18   


 


B/P (MAP) 130/75   


 


Pulse Ox 98   


 


O2 Delivery Room Air   














Blood Pressure Mean:  93








Progress Note :  


Progress Note


NO DETERIORATION IN PT'S CONDITION DURING ER STAY





ECG


Initial ECG Impression Time:  19:45


Initial ECG Rate:  63


Initial ECG Rhythm:  Normal Sinus


Initial ECG Comparisson:  No Previous ECG Available





Diagnostic Imaging





Comments


CT HEAD/MAXILLOFACIALS/CERVICAL SPINE--NO ACUTE PROCESS--PER RADIOLOGIST VIA 

PHONE AT 2046


CT THORACIC AND LUMBAR SPINE--NO ACUTE PROCESS, PER RADIOLOGIST REPORTS AT 2200


CXR--NO ACUTE PROCESS, PER RADIOLOGIST REPORT @ 2200


PELVIS XRAY--NO ACUTE PROCESS, PER RADIOLOGIST REPORT @ 2200


   Reviewed:  Reviewed by Me, Discussed w/Radiologist





Departure


Communication


Progress Notes


2116--SPOKE WITH DR. MURRAY, ACCEPTS PT FOR ADMIT. WILL HAVE CARDIOLOGY 

CONSULTED IN AM





Impression


Impression:  


 Primary Impression:  


 Syncope


 Additional Impressions:  


 Closed head injury


 LABILE HTN--NEW DX OF HTN


 FACIAL ABRASIONS


 HEAD AND FACIAL CONTUSIONS


 CERVICAL SPINE STRAIN


 Diphtheria-pertussis-tetanus (DPT) vaccination administered at current visit


Disposition:  09 ADMITTED AS INPATIENT


Condition:  Improved





Departure-Patient Inst.


Referrals:  


MAYNOR CASTRO DO (PCP)


Primary Care Physician











LIZETTE LEDESMA DO Jun 19, 2017 05:25

## 2017-06-19 NOTE — DIAGNOSTIC IMAGING REPORT
PROCEDURE: 

US Carotid Duplex Bilateral.



TECHNIQUE: 

Multiple real-time grayscale images were obtained over the

carotid arteries in various projections bilaterally. Additional

duplex Doppler and color Doppler images were also obtained.



INDICATION: 

Syncope.



FINDINGS:

There is no atherosclerotic plaque seen at either carotid

bifurcation. The velocities and waveforms appear normal. Both

vertebral arteries are patent with antegrade flow. 



IMPRESSION: 

Negative carotid Doppler.



Dictated by: 



  Dictated on workstation # [id number]

## 2017-06-19 NOTE — DIAGNOSTIC IMAGING REPORT
EXAM: CTA of the aorta and bilateral runoffs.



INDICATION: Left-sided pain. Elevated blood pressure.



FINDINGS:  

There are no previous studies available for comparison. 



Contiguous axial sections were taken through the abdomen, pelvis

and lower extremities following administration of intravenous

contrast. Sagittal and coronal reconstructed images were also

obtained.



The aorta is not aneurysmally dilated and there is no sign of a

dissection of the aorta. There is no hemodynamically significant

stenosis of the major branches of the aorta.



There is generally good arterial blood flow in the common

femoral, superficial femoral and popliteal arteries bilaterally.

There is no sign of a hemodynamically significant stenosis. The

trifurcation arteries are small. There is only a single vessel

(posterior tibial artery) runoff to each lower extremity.



The liver, spleen, pancreas, adrenals, gallbladder, inferior vena

cava and kidneys are unremarkable for an acute abnormality. There

are parapelvic cysts involving both kidneys. These have a

generally benign appearance.



The stomach is partially filled with fluid and consequently

difficult to assess.



The uterine fundus is prominent but there is no definite fibroid

identified. The urinary bladder is grossly unremarkable. The

appendix was not well-visualized but there are no indirect signs

of acute appendicitis.



The bone windows are unremarkable for a fracture or for a

destructive lesion. As noted on the CT thoracic and lumbar spine

exam performed on 6/18/17 there is degenerative disc and bony

disease in the lower thoracic and lower lumbar spine.



The lung bases are clear.



IMPRESSION:

1. There is no evidence for an aneurysm of the aorta. There is no

sign of a dissection either.

2. There is good arterial blood flow in the common femoral,

superficial femoral and popliteal arteries. There is no

hemodynamically significant stenosis of the trifurcation arteries

but the trifurcation arteries are small and there is only a

single vessel (posterior tibial artery) runoff to the ankle

joints.

3. There is no acute abnormality the abdomen or pelvis.

4. There are bilateral parapelvic cysts.

5. There is degenerative disc and bony disease involving the

thoracic and lumbar spine.



Dictated by: 



  Dictated on workstation # [id number]

## 2017-06-20 VITALS — SYSTOLIC BLOOD PRESSURE: 182 MMHG | DIASTOLIC BLOOD PRESSURE: 81 MMHG

## 2017-06-20 VITALS — SYSTOLIC BLOOD PRESSURE: 158 MMHG | DIASTOLIC BLOOD PRESSURE: 77 MMHG

## 2017-06-20 VITALS — SYSTOLIC BLOOD PRESSURE: 151 MMHG | DIASTOLIC BLOOD PRESSURE: 89 MMHG

## 2017-06-20 VITALS — DIASTOLIC BLOOD PRESSURE: 80 MMHG | SYSTOLIC BLOOD PRESSURE: 137 MMHG

## 2017-06-20 VITALS — SYSTOLIC BLOOD PRESSURE: 139 MMHG | DIASTOLIC BLOOD PRESSURE: 66 MMHG

## 2017-06-20 RX ADMIN — MUPIROCIN SCH GM: 20 OINTMENT TOPICAL at 08:26

## 2017-06-20 RX ADMIN — SULFAMETHOXAZOLE AND TRIMETHOPRIM SCH EA: 800; 160 TABLET ORAL at 06:12

## 2017-06-20 RX ADMIN — SULFAMETHOXAZOLE AND TRIMETHOPRIM SCH EA: 800; 160 TABLET ORAL at 17:02

## 2017-06-20 NOTE — PROGRESS NOTE-CARDIOLOGY
Cardiology SOAP Progress Note


Subjective:


Sitting up in bed.  No further c/o syncope or near syncope.  No c/o CP, dyspnea 

or palpitations.





She times the onset of dizziness and poor appetite and low oral intake to the 

time of initiation of her anti-htn regimen





Objective:


I&O/Vital Signs





Vital Sign - Last 12Hours








 6/20/17 6/20/17 6/20/17 6/20/17





 07:00 08:30 10:05 10:06


 


Temp  98.2  


 


Pulse 68 70  


 


Resp  20  


 


B/P (MAP)  137/80 137/80 (99) 146/80 (102)


 


Pulse Ox  95  


 


O2 Delivery  Room Air  


 


    





 6/20/17 6/20/17 6/20/17 6/20/17





 10:06 12:00 12:00 12:00


 


Temp    97.7


 


Pulse    63


 


Resp    20


 


B/P (MAP) 151/89 (109) 168/77 (107) 182/81 (114) 


 


Pulse Ox    97


 


O2 Delivery    Room Air


 


    





 6/20/17 6/20/17  





 12:00 13:00  


 


Pulse  56  


 


B/P (MAP) 158/70 (99)   














Intake and Output 


 


 6/20/17





 00:00


 


Intake Total 1290 ml


 


Balance 1290 ml








Weight (Pounds):  194


Weight (Ounces):  0.0


Weight (Calculated Kilograms):  87.074550


Constitutional:  AAO x 3


Respiratory:  No accessory muscle use, No respiratory distress, chest expansion 

is symmetric, chest is bilaterally symmetric, lungs clear to auscultation


Cardiovascular:  regular rate-rhythm, No JVD, S1 and S2


Gastrointestional:  No tender, soft, audible bowel sounds


Extremities:  No significant edema


Neurologic/Psychiatric:  grossly intact


Skin:  other (abrasions to left side of her note and upper lip)





A/P:


Assessment:





Syncopal episode of undetermined etiology - possible orthostatic hypotension, 

but other etiologies need considered, especially arrhythmic etiologies





Echo and exercise-stress echo on 6/20/17 did not indicate cardiomyopathy or 

ischemic heart disease or exercise-induced arrhythmia





Labile hypertension, the hypertensive component of which appears to be white-

coat hypertension





Syncope likely due to orthostatic hypotension following initiation of an anti-

htn regimen recently





The renal arteries are largely obscured. There is diminished amplitude of the 

arterial waveforms in the right kidney which could correlate with renal artery 

stenosis. Evaluation with CTA or MRA of the abdomen is recommended. Per u/s of 6 -14-17





Fibromyalgia





Normal TSH on 6-18-17





Negative carotid duplex of 6-19-17


Plan:


Syncopal episode of undetermined etiology.  


Possibly d/t orthostatic hypotension d/t medications.  We will stop 

antihypertensive.


Ortho v/s unremarkable    


Ambulate in halls.  


Continue tele to evaluate for possible arrhythmia or bradycardia.  If none 

found this admission then may need outpt cardiac monitor.  


Echocardiogram and stress echocardiogram today





Physician Assessment


Physician Assessment


Lungs: clear


Cor: reg


No leg edema





A&R


* As documented in our note above that I updated at the time of this dictation (

italics)


* Based on observations of this hospitalization, her htn appears mostly of the 

white-coat variety. We have advised cessation of her anti-htn regimen


* We are initiating a 3-week ambulatory cardiac monitoring to eval for any 

significant arrhythmia


* I spoke with her in detail and answered her questions


* I also spoke in detail with SAMMIE Rios Jun 20, 2017 11:08


BRENDON BLAS MD FACP FAC CCDS Jun 20, 2017 16:43

## 2017-06-20 NOTE — DISCHARGE INST-SIMPLE/STANDARD
Discharge Inst-Standard


Discharge Medications


New, Converted or Re-Newed RX:  Transmitted to Pharmacy





Patient Instructions/Follow Up


Plan of Care/Instructions/FU:  


Fwup with me in 1 week


Activity as Tolerated:  Yes


Discharge Diet:  No Restrictions











MAYNOR CASTRO DO Jun 20, 2017 5:39 pm

## 2017-06-21 NOTE — XMS REPORT
Continuity of Care Document

 Created on: 2017



GEETHA RASHID

External Reference #: [id number]

: 1970

Sex: Female



Demographics







 Address  [address] [place]  [address]

 

 Home Phone  [phone]

 

 Preferred Language  Unknown

 

 Marital Status  Unknown

 

 Adventism Affiliation  Unknown

 

 Race  Unknown

 

 Ethnic Group  Unknown





Author







 Author  [facility]

 

 Organization  [facility]

 

 Address  Unknown

 

 Phone  Unavailable



                                                      



Allergies

                      





 Active                    Description                    Code                  
  Type                    Severity                    Reaction                  
  Onset                    Reported/Identified                    Relationship 
to Patient                    Clinical Status                

 

 Yes                    No Allergy Information Available                    
[id number]                    Drug Allergy                    Unknown          
          N/A                                         10/17/2014               
                                           

 

 Yes                    No Known Drug Allergies                    [id number]  
                  Drug Allergy                    Unknown                    N/
A                                         2017                           
                               



                                                                               
                   



Medications

                                                                               
         



Problems

                      





 Date Dx Coded                    Attending                    Type            
        Code                    Diagnosis                    Diagnosed By      
          

 

 2015                                         Ot                    
V76.12                                                          

 

 2015                    MAYNOR CASTRO DO S                    Ot  
                  793.89                                                       
   

 

 2015                    MAYNOR CASTRO DO S                    Ot  
                  V76.12                                                       
   

 

 2015                    HOMAR MONZON                    Ot     
               793.80                                                          

 

 2015                    DAVINA MURRAY FNP                    Ot     
               112.0                                                          

 

 2015                    DAVINA MURRAYP                    Ot     
               306.4                                                          

 

 2015                    SEUN HERNADEZ ARNP                    
Ot                    626.8                                                    
      

 

 2015                    SEUN HERNADEZ ARNP                    
Ot                    626.8                                                    
      

 

 2015                    SEUN HERNADEZ ARNP                    
Ot                    626.8                                                    
      

 

 2017                    MAYNOR CASTRO DO S                    Ot  
                  793.89                    OTH (ABN) FINDINGS ON RADIOLOGICAL 
EXAMI                                     

 

 2017                    MAYNOR CASTRO DO S                    Ot  
                  V76.12                    OTH SCREEN MAMMO-MALIGN NEOPLASM OF 
BERNARDINO                                     

 

 2017                    HOMAR MONZON APRN                    Ot     
               793.80                    UNSPEC ABNORMAL MAMMOGRAM             
                        

 

 2017                    DAVINA MURRAY FNIRMA                    Ot     
               112.0                    THRUSH                                 
    

 

 2017                    DAVINA MURRAY FNP                    Ot     
               306.4                    PSYCHOGENIC GI DISEASE                 
                    

 

 2017                    SEUN HERNADEZP                    
Ot                    626.8                    MENSTRUAL DISORDER NEC          
                           



                                                                               
                                                                               
                                                                      



Procedures

                                                                               
         



Results

                      





 Test                    Result                    Range                









 Complete blood count (CBC) with automated white blood cell (WBC) differential 
- 17 19:40                









 Blood leukocytes automated count (number/volume)                    9.3 10*3/
uL                    4.3-11.0                

 

 Blood erythrocytes automated count (number/volume)                    4.63 10*6
/uL                    4.35-5.85                

 

 Venous blood hemoglobin measurement (mass/volume)                    13.8 g/dL
                    11.5-16.0                

 

 Blood hematocrit (volume fraction)                    42 %                    
35-52                

 

 Automated erythrocyte mean corpuscular volume                    90 [foz_us]  
                  80-99                

 

 Automated erythrocyte mean corpuscular hemoglobin (mass per erythrocyte)      
              30 pg                    25-34                

 

 Automated erythrocyte mean corpuscular hemoglobin concentration measurement (
mass/volume)                    33 g/dL                    32-36                

 

 Automated erythrocyte distribution width ratio                    12.9 %      
              10.0-14.5                

 

 Automated blood platelet count (count/volume)                    186 10*3/uL  
                  130-400                

 

 Automated blood platelet mean volume measurement                    12.7 [foz_
us]                    7.4-10.4                

 

 Automated blood neutrophils/100 leukocytes                    45 %            
        42-75                

 

 Automated blood lymphocytes/100 leukocytes                    47 %            
        12-44                

 

 Blood monocytes/100 leukocytes                    7 %                    0-12 
               

 

 Automated blood eosinophils/100 leukocytes                    1 %             
       0-10                

 

 Automated blood basophils/100 leukocytes                    0 %               
     0-10                

 

 Blood neutrophils automated count (number/volume)                    4.2 10*3 
                   1.8-7.8                

 

 Blood lymphocytes automated count (number/volume)                    4.3 10*3 
                   1.0-4.0                

 

 Blood monocytes automated count (number/volume)                    0.6 10*3   
                 0.0-1.0                

 

 Automated eosinophil count                    0.1 10*3/uL                    
0.0-0.3                

 

 Automated blood basophil count (count/volume)                    0.0 10*3/uL  
                  0.0-0.1                









 PT panel in platelet poor plasma by coagulation assay - 17 19:40        
        









 Prothrombin time (PT) in platelet poor plasma by coagulation assay            
        12.9 s                    12.2-14.7                

 

 INR in platelet poor plasma or blood by coagulation assay                    
1.0                     0.8-1.4                









 Activated partial thromboplastin time (aPTT) in platelet poor plasma 
bycoagulation assay - 17 19:40                









 Activated partial thromboplastin time (aPTT) in platelet poor plasma 
bycoagulation assay                    26 s                    24-35           
     









 Comprehensive metabolic panel - 17 19:40                









 Serum or plasma sodium measurement (moles/volume)                    137 mmol/
L                    135-145                

 

 Serum or plasma potassium measurement (moles/volume)                    3.7 
mmol/L                    3.6-5.0                

 

 Serum or plasma chloride measurement (moles/volume)                    101 mmol
/L                                    

 

 Carbon dioxide                    22 mmol/L                    21-32          
      

 

 Serum or plasma anion gap determination (moles/volume)                    14 
mmol/L                    5-14                

 

 Serum or plasma urea nitrogen measurement (mass/volume)                    18 
mg/dL                    7-18                

 

 Serum or plasma creatinine measurement (mass/volume)                    1.11 mg
/dL                    0.60-1.30                

 

 Serum or plasma urea nitrogen/creatinine mass ratio                    16     
                0-20                

 

 Serum or plasma creatinine measurement with calculation of estimated 
glomerular filtration rate                    52                     NRG       
         

 

 Serum or plasma glucose measurement (mass/volume)                    121 mg/dL
                                    

 

 Serum or plasma calcium measurement (mass/volume)                    9.2 mg/dL
                    8.5-10.1                

 

 Serum or plasma total bilirubin measurement (mass/volume)                    
0.5 mg/dL                    0.1-1.0                

 

 Serum or plasma alkaline phosphatase measurement (enzymatic activity/volume)  
                  47 U/L                                    

 

 Serum or plasma aspartate aminotransferase measurement (enzymatic activity/
volume)                    20 U/L                    5-34                

 

 Serum or plasma alanine aminotransferase measurement (enzymatic activity/volume
)                    12 U/L                    0-55                

 

 Serum or plasma protein measurement (mass/volume)                    7.2 g/dL 
                   6.4-8.2                

 

 Serum or plasma albumin measurement (mass/volume)                    4.4 g/dL 
                   3.2-4.5                









 Magnesium - 17 19:40                









 Magnesium                    2.3 mg/dL                    1.8-2.4             
   









 Serum or plasma creatine kinase measurement (enzymatic activity/volume) -  19:40                









 Serum or plasma creatine kinase measurement (enzymatic activity/volume)       
             87 U/L                                    









 Serum or plasma lithium measurement (moles/volume) - 17 19:40           
     









 BNP level                    < pg/mL                    <100.0                









 Serum or plasma creatine kinase MB measurement (enzymatic activity/volume) -  19:40                









 Serum or plasma creatine kinase MB measurement (enzymatic activity/volume)    
                0.7 ng/mL                    <6.6                









 Serum or plasma troponin i.cardiac measurement (mass/volume) - 17 19:40 
               









 Serum or plasma troponin i.cardiac measurement (mass/volume)                  
  < ng/mL                    <0.30                









 Serum or plasma thyrotropin measurement by detection limit <=0.05 miu/l (units/
volume) - 17 19:40                









 Serum or plasma thyrotropin measurement by detection limit <=0.05 miu/l (units/
volume)                    1.72 u[iU]/mL                    0.35-4.94          
      









 Complete urinalysis with reflex to culture - 17 21:50                









 Urine color determination                    YELLOW                     NRG   
             

 

 Urine clarity determination                    CLEAR                     NRG  
              

 

 Urine pH measurement by test strip                    6.5                     5
-9                

 

 Specific gravity of urine by test strip                    1.010              
       1.016-1.022                

 

 Urine protein assay by test strip, semi-quantitative                    1+    
                 NEGATIVE                

 

 Urine glucose detection by automated test strip                    NEGATIVE   
                  NEGATIVE                

 

 Erythrocytes detection in urine sediment by light microscopy                  
  2+                     NEGATIVE                

 

 Urine ketones detection by automated test strip                    NEGATIVE   
                  NEGATIVE                

 

 Urine nitrite detection by test strip                    NEGATIVE             
        NEGATIVE                

 

 Urine total bilirubin detection by test strip                    NEGATIVE     
                NEGATIVE                

 

 Urine urobilinogen measurement by automated test strip (mass/volume)          
          NORMAL                     NORMAL                

 

 Urine leukocyte esterase detection by dipstick                    NEGATIVE    
                 NEGATIVE                

 

 Automated urine sediment erythrocyte count by microscopy (number/high power 
field)                    NONE                     NRG                

 

 Automated urine sediment leukocyte count by microscopy (number/high power field
)                    NONE                     NRG                

 

 Bacteria detection in urine sediment by light microscopy                    
TRACE                     NRG                

 

 Squamous epithelial cells detection in urine sediment by light microscopy     
               RARE                     NRG                

 

 Crystals detection in urine sediment by light microscopy                    
NONE                     NRG                

 

 Casts detection in urine sediment by light microscopy                    NONE 
                    NRG                

 

 Mucus detection in urine sediment by light microscopy                    
NEGATIVE                     NRG                

 

 Complete urinalysis with reflex to culture                    NO              
       NRG                









 Serum or plasma troponin i.cardiac measurement (mass/volume) - 17 01:58 
               









 Serum or plasma troponin i.cardiac measurement (mass/volume)                  
  < ng/mL                    <0.30                









 Myoglobin, serum - 17 01:58                









 Myoglobin, serum                    67.8 ng/mL                    10.0-92.0   
             









 Complete blood count (CBC) with automated white blood cell (WBC) differential 
- 17 06:27                









 Blood leukocytes automated count (number/volume)                    9.6 10*3/
uL                    4.3-11.0                

 

 Blood erythrocytes automated count (number/volume)                    4.57 10*6
/uL                    4.35-5.85                

 

 Venous blood hemoglobin measurement (mass/volume)                    13.4 g/dL
                    11.5-16.0                

 

 Blood hematocrit (volume fraction)                    41 %                    
35-52                

 

 Automated erythrocyte mean corpuscular volume                    90 [foz_us]  
                  80-99                

 

 Automated erythrocyte mean corpuscular hemoglobin (mass per erythrocyte)      
              29 pg                    25-34                

 

 Automated erythrocyte mean corpuscular hemoglobin concentration measurement (
mass/volume)                    33 g/dL                    32-36                

 

 Automated erythrocyte distribution width ratio                    12.9 %      
              10.0-14.5                

 

 Automated blood platelet count (count/volume)                    186 10*3/uL  
                  130-400                

 

 Automated blood platelet mean volume measurement                    12.3 [foz_
us]                    7.4-10.4                

 

 Automated blood neutrophils/100 leukocytes                    56 %            
        42-75                

 

 Automated blood lymphocytes/100 leukocytes                    35 %            
        12-44                

 

 Blood monocytes/100 leukocytes                    8 %                    0-12 
               

 

 Automated blood eosinophils/100 leukocytes                    1 %             
       0-10                

 

 Automated blood basophils/100 leukocytes                    0 %               
     0-10                

 

 Blood neutrophils automated count (number/volume)                    5.4 10*3 
                   1.8-7.8                

 

 Blood lymphocytes automated count (number/volume)                    3.3 10*3 
                   1.0-4.0                

 

 Blood monocytes automated count (number/volume)                    0.7 10*3   
                 0.0-1.0                

 

 Automated eosinophil count                    0.1 10*3/uL                    
0.0-0.3                

 

 Automated blood basophil count (count/volume)                    0.0 10*3/uL  
                  0.0-0.1                









 Comprehensive metabolic panel - 17 06:27                









 Serum or plasma sodium measurement (moles/volume)                    138 mmol/
L                    135-145                

 

 Serum or plasma potassium measurement (moles/volume)                    3.7 
mmol/L                    3.6-5.0                

 

 Serum or plasma chloride measurement (moles/volume)                    104 mmol
/L                                    

 

 Carbon dioxide                    23 mmol/L                    21-32          
      

 

 Serum or plasma anion gap determination (moles/volume)                    11 
mmol/L                    5-14                

 

 Serum or plasma urea nitrogen measurement (mass/volume)                    14 
mg/dL                    7-18                

 

 Serum or plasma creatinine measurement (mass/volume)                    0.94 mg
/dL                    0.60-1.30                

 

 Serum or plasma urea nitrogen/creatinine mass ratio                    15     
                0-20                

 

 Serum or plasma creatinine measurement with calculation of estimated 
glomerular filtration rate                    >                     NRG        
        

 

 Serum or plasma glucose measurement (mass/volume)                    95 mg/dL 
                                   

 

 Serum or plasma calcium measurement (mass/volume)                    9.1 mg/dL
                    8.5-10.1                

 

 Serum or plasma total bilirubin measurement (mass/volume)                    
0.6 mg/dL                    0.1-1.0                

 

 Serum or plasma alkaline phosphatase measurement (enzymatic activity/volume)  
                  44 U/L                                    

 

 Serum or plasma aspartate aminotransferase measurement (enzymatic activity/
volume)                    17 U/L                    5-34                

 

 Serum or plasma alanine aminotransferase measurement (enzymatic activity/volume
)                    11 U/L                    0-55                

 

 Serum or plasma protein measurement (mass/volume)                    6.8 g/dL 
                   6.4-8.2                

 

 Serum or plasma albumin measurement (mass/volume)                    4.2 g/dL 
                   3.2-4.5                



                                                                               
                                                                               
                                                            



Encounters

                      





 ACCT No.                    Visit Date/Time                    Discharge      
              Status                    Pt. Type                    Provider   
                 Facility                    Loc./Unit                    
Complaint                

 

 [id number]                    2015 15:51:00                    2015 23:59:59                    [facility]                    Outpatient             
       SEUN HERNADEZ ARNP                    [facility]                    RAD                    DUB                

 

 [id number]                    10/17/2014 15:06:00                    10/17/
2014 23:59:59                    [facility]                    Outpatient             
       DAVINA MURRAY FNP                    [facility]                    RAD                    GLOBUS SENSATION           
     

 

 [id number]                    2013 12:36:00                    2013 23:59:59                    [facility]                    Outpatient             
       HOMAR MONZON APRN                    [facility]                    RAD                    FOLLOW-UP                

 

 [id number]                    2013 14:47:00                    2013 23:59:59                    [facility]S                    Outpatient             
       MAYNOR CASTRO DO S                    [facility]                    RAD                    SCREENING                

 

 [id number]                    2017 21:15:00                           
              ACT                    Inpatient                    MAYNOR CASTRO DO S                    [facility]               
     4TH                    SYNCOPE,CLOSED HEAD INJ,FACIAL ABRASIONS           
     

 

 [id number]                    2017 08:04:00                           
              ACT                    Outpatient                    ASIF CARSONP                    [facility]             
       RAD                    NEW ONSET HTN                

 

 [id number]                    2017 14:15:00                           
              ACT                    Outpatient                    ASIF CARSONP                    [facility]             
       RAD                    NEW ONSET HTN                

 

 [id number]                    2010 08:52:00                           
                                   Document Registration

## 2017-06-22 NOTE — XMS REPORT
Continuity of Care Document

 Created on: 2017



GEETHA RASHID

External Reference #: [id number]

: 1970

Sex: Female



Demographics







 Address  [address] [place]  [address]

 

 Home Phone  [phone]

 

 Preferred Language  Unknown

 

 Marital Status  Unknown

 

 Restorationist Affiliation  Unknown

 

 Race  Unknown

 

 Ethnic Group  Unknown





Author







 Author  [facility]

 

 Organization  [facility]

 

 Address  Unknown

 

 Phone  Unavailable



                                                      



Allergies

                      





 Active                    Description                    Code                  
  Type                    Severity                    Reaction                  
  Onset                    Reported/Identified                    Relationship 
to Patient                    Clinical Status                

 

 Yes                    No Allergy Information Available                    
[id number]                    Drug Allergy                    Unknown          
          N/A                                         10/17/2014               
                                           

 

 Yes                    No Known Drug Allergies                    [id number]  
                  Drug Allergy                    Unknown                    N/
A                                         2017                           
                               



                                                                               
                   



Medications

                                                                               
         



Problems

                      





 Date Dx Coded                    Attending                    Type            
        Code                    Diagnosis                    Diagnosed By      
          

 

 2015                                         Ot                    
V76.12                                                          

 

 2015                    MAYNOR CASTRO DO S                    Ot  
                  793.89                                                       
   

 

 2015                    MAYNOR CASTRO DO S                    Ot  
                  V76.12                                                       
   

 

 2015                    HOMAR MONZON                    Ot     
               793.80                                                          

 

 2015                    DAVINA MURRAY FNP                    Ot     
               112.0                                                          

 

 2015                    DAVINA MURRAY FNP                    Ot     
               306.4                                                          

 

 2015                    SEUN HERNADEZ ARNP                    
Ot                    626.8                                                    
      

 

 2015                    SEUN HERNADEZ ARNP                    
Ot                    626.8                                                    
      

 

 2015                    SEUN HERNADEZ ARNP                    
Ot                    626.8                                                    
      

 

 2017                    MAYNOR CASTRO DO S                    Ot  
                  793.89                    OTH (ABN) FINDINGS ON RADIOLOGICAL 
EXAMI                                     

 

 2017                    MAYNOR CASTRO DO S                    Ot  
                  V76.12                    OTH SCREEN MAMMO-MALIGN NEOPLASM OF 
BERNARDINO                                     

 

 2017                    HOMAR MONZON APRN                    Ot     
               793.80                    UNSPEC ABNORMAL MAMMOGRAM             
                        

 

 2017                    DAVINA MURRAY FNIRMA                    Ot     
               112.0                    THRUSH                                 
    

 

 2017                    DAVINA MURRAY FNP                    Ot     
               306.4                    PSYCHOGENIC GI DISEASE                 
                    

 

 2017                    SEUN HERNADEZP                    
Ot                    626.8                    MENSTRUAL DISORDER NEC          
                           



                                                                               
                                                                               
                                                                      



Procedures

                                                                               
         



Results

                      





 Test                    Result                    Range                









 Complete blood count (CBC) with automated white blood cell (WBC) differential 
- 17 19:40                









 Blood leukocytes automated count (number/volume)                    9.3 10*3/
uL                    4.3-11.0                

 

 Blood erythrocytes automated count (number/volume)                    4.63 10*6
/uL                    4.35-5.85                

 

 Venous blood hemoglobin measurement (mass/volume)                    13.8 g/dL
                    11.5-16.0                

 

 Blood hematocrit (volume fraction)                    42 %                    
35-52                

 

 Automated erythrocyte mean corpuscular volume                    90 [foz_us]  
                  80-99                

 

 Automated erythrocyte mean corpuscular hemoglobin (mass per erythrocyte)      
              30 pg                    25-34                

 

 Automated erythrocyte mean corpuscular hemoglobin concentration measurement (
mass/volume)                    33 g/dL                    32-36                

 

 Automated erythrocyte distribution width ratio                    12.9 %      
              10.0-14.5                

 

 Automated blood platelet count (count/volume)                    186 10*3/uL  
                  130-400                

 

 Automated blood platelet mean volume measurement                    12.7 [foz_
us]                    7.4-10.4                

 

 Automated blood neutrophils/100 leukocytes                    45 %            
        42-75                

 

 Automated blood lymphocytes/100 leukocytes                    47 %            
        12-44                

 

 Blood monocytes/100 leukocytes                    7 %                    0-12 
               

 

 Automated blood eosinophils/100 leukocytes                    1 %             
       0-10                

 

 Automated blood basophils/100 leukocytes                    0 %               
     0-10                

 

 Blood neutrophils automated count (number/volume)                    4.2 10*3 
                   1.8-7.8                

 

 Blood lymphocytes automated count (number/volume)                    4.3 10*3 
                   1.0-4.0                

 

 Blood monocytes automated count (number/volume)                    0.6 10*3   
                 0.0-1.0                

 

 Automated eosinophil count                    0.1 10*3/uL                    
0.0-0.3                

 

 Automated blood basophil count (count/volume)                    0.0 10*3/uL  
                  0.0-0.1                









 PT panel in platelet poor plasma by coagulation assay - 17 19:40        
        









 Prothrombin time (PT) in platelet poor plasma by coagulation assay            
        12.9 s                    12.2-14.7                

 

 INR in platelet poor plasma or blood by coagulation assay                    
1.0                     0.8-1.4                









 Activated partial thromboplastin time (aPTT) in platelet poor plasma 
bycoagulation assay - 17 19:40                









 Activated partial thromboplastin time (aPTT) in platelet poor plasma 
bycoagulation assay                    26 s                    24-35           
     









 Comprehensive metabolic panel - 17 19:40                









 Serum or plasma sodium measurement (moles/volume)                    137 mmol/
L                    135-145                

 

 Serum or plasma potassium measurement (moles/volume)                    3.7 
mmol/L                    3.6-5.0                

 

 Serum or plasma chloride measurement (moles/volume)                    101 mmol
/L                                    

 

 Carbon dioxide                    22 mmol/L                    21-32          
      

 

 Serum or plasma anion gap determination (moles/volume)                    14 
mmol/L                    5-14                

 

 Serum or plasma urea nitrogen measurement (mass/volume)                    18 
mg/dL                    7-18                

 

 Serum or plasma creatinine measurement (mass/volume)                    1.11 mg
/dL                    0.60-1.30                

 

 Serum or plasma urea nitrogen/creatinine mass ratio                    16     
                0-20                

 

 Serum or plasma creatinine measurement with calculation of estimated 
glomerular filtration rate                    52                     NRG       
         

 

 Serum or plasma glucose measurement (mass/volume)                    121 mg/dL
                                    

 

 Serum or plasma calcium measurement (mass/volume)                    9.2 mg/dL
                    8.5-10.1                

 

 Serum or plasma total bilirubin measurement (mass/volume)                    
0.5 mg/dL                    0.1-1.0                

 

 Serum or plasma alkaline phosphatase measurement (enzymatic activity/volume)  
                  47 U/L                                    

 

 Serum or plasma aspartate aminotransferase measurement (enzymatic activity/
volume)                    20 U/L                    5-34                

 

 Serum or plasma alanine aminotransferase measurement (enzymatic activity/volume
)                    12 U/L                    0-55                

 

 Serum or plasma protein measurement (mass/volume)                    7.2 g/dL 
                   6.4-8.2                

 

 Serum or plasma albumin measurement (mass/volume)                    4.4 g/dL 
                   3.2-4.5                









 Magnesium - 17 19:40                









 Magnesium                    2.3 mg/dL                    1.8-2.4             
   









 Serum or plasma creatine kinase measurement (enzymatic activity/volume) -  19:40                









 Serum or plasma creatine kinase measurement (enzymatic activity/volume)       
             87 U/L                                    









 Serum or plasma lithium measurement (moles/volume) - 17 19:40           
     









 BNP level                    < pg/mL                    <100.0                









 Serum or plasma creatine kinase MB measurement (enzymatic activity/volume) -  19:40                









 Serum or plasma creatine kinase MB measurement (enzymatic activity/volume)    
                0.7 ng/mL                    <6.6                









 Serum or plasma troponin i.cardiac measurement (mass/volume) - 17 19:40 
               









 Serum or plasma troponin i.cardiac measurement (mass/volume)                  
  < ng/mL                    <0.30                









 Serum or plasma thyrotropin measurement by detection limit <=0.05 miu/l (units/
volume) - 17 19:40                









 Serum or plasma thyrotropin measurement by detection limit <=0.05 miu/l (units/
volume)                    1.72 u[iU]/mL                    0.35-4.94          
      









 Complete urinalysis with reflex to culture - 17 21:50                









 Urine color determination                    YELLOW                     NRG   
             

 

 Urine clarity determination                    CLEAR                     NRG  
              

 

 Urine pH measurement by test strip                    6.5                     5
-9                

 

 Specific gravity of urine by test strip                    1.010              
       1.016-1.022                

 

 Urine protein assay by test strip, semi-quantitative                    1+    
                 NEGATIVE                

 

 Urine glucose detection by automated test strip                    NEGATIVE   
                  NEGATIVE                

 

 Erythrocytes detection in urine sediment by light microscopy                  
  2+                     NEGATIVE                

 

 Urine ketones detection by automated test strip                    NEGATIVE   
                  NEGATIVE                

 

 Urine nitrite detection by test strip                    NEGATIVE             
        NEGATIVE                

 

 Urine total bilirubin detection by test strip                    NEGATIVE     
                NEGATIVE                

 

 Urine urobilinogen measurement by automated test strip (mass/volume)          
          NORMAL                     NORMAL                

 

 Urine leukocyte esterase detection by dipstick                    NEGATIVE    
                 NEGATIVE                

 

 Automated urine sediment erythrocyte count by microscopy (number/high power 
field)                    NONE                     NRG                

 

 Automated urine sediment leukocyte count by microscopy (number/high power field
)                    NONE                     NRG                

 

 Bacteria detection in urine sediment by light microscopy                    
TRACE                     NRG                

 

 Squamous epithelial cells detection in urine sediment by light microscopy     
               RARE                     NRG                

 

 Crystals detection in urine sediment by light microscopy                    
NONE                     NRG                

 

 Casts detection in urine sediment by light microscopy                    NONE 
                    NRG                

 

 Mucus detection in urine sediment by light microscopy                    
NEGATIVE                     NRG                

 

 Complete urinalysis with reflex to culture                    NO              
       NRG                









 Serum or plasma troponin i.cardiac measurement (mass/volume) - 17 01:58 
               









 Serum or plasma troponin i.cardiac measurement (mass/volume)                  
  < ng/mL                    <0.30                









 Myoglobin, serum - 17 01:58                









 Myoglobin, serum                    67.8 ng/mL                    10.0-92.0   
             









 Complete blood count (CBC) with automated white blood cell (WBC) differential 
- 17 06:27                









 Blood leukocytes automated count (number/volume)                    9.6 10*3/
uL                    4.3-11.0                

 

 Blood erythrocytes automated count (number/volume)                    4.57 10*6
/uL                    4.35-5.85                

 

 Venous blood hemoglobin measurement (mass/volume)                    13.4 g/dL
                    11.5-16.0                

 

 Blood hematocrit (volume fraction)                    41 %                    
35-52                

 

 Automated erythrocyte mean corpuscular volume                    90 [foz_us]  
                  80-99                

 

 Automated erythrocyte mean corpuscular hemoglobin (mass per erythrocyte)      
              29 pg                    25-34                

 

 Automated erythrocyte mean corpuscular hemoglobin concentration measurement (
mass/volume)                    33 g/dL                    32-36                

 

 Automated erythrocyte distribution width ratio                    12.9 %      
              10.0-14.5                

 

 Automated blood platelet count (count/volume)                    186 10*3/uL  
                  130-400                

 

 Automated blood platelet mean volume measurement                    12.3 [foz_
us]                    7.4-10.4                

 

 Automated blood neutrophils/100 leukocytes                    56 %            
        42-75                

 

 Automated blood lymphocytes/100 leukocytes                    35 %            
        12-44                

 

 Blood monocytes/100 leukocytes                    8 %                    0-12 
               

 

 Automated blood eosinophils/100 leukocytes                    1 %             
       0-10                

 

 Automated blood basophils/100 leukocytes                    0 %               
     0-10                

 

 Blood neutrophils automated count (number/volume)                    5.4 10*3 
                   1.8-7.8                

 

 Blood lymphocytes automated count (number/volume)                    3.3 10*3 
                   1.0-4.0                

 

 Blood monocytes automated count (number/volume)                    0.7 10*3   
                 0.0-1.0                

 

 Automated eosinophil count                    0.1 10*3/uL                    
0.0-0.3                

 

 Automated blood basophil count (count/volume)                    0.0 10*3/uL  
                  0.0-0.1                









 Comprehensive metabolic panel - 17 06:27                









 Serum or plasma sodium measurement (moles/volume)                    138 mmol/
L                    135-145                

 

 Serum or plasma potassium measurement (moles/volume)                    3.7 
mmol/L                    3.6-5.0                

 

 Serum or plasma chloride measurement (moles/volume)                    104 mmol
/L                                    

 

 Carbon dioxide                    23 mmol/L                    21-32          
      

 

 Serum or plasma anion gap determination (moles/volume)                    11 
mmol/L                    5-14                

 

 Serum or plasma urea nitrogen measurement (mass/volume)                    14 
mg/dL                    7-18                

 

 Serum or plasma creatinine measurement (mass/volume)                    0.94 mg
/dL                    0.60-1.30                

 

 Serum or plasma urea nitrogen/creatinine mass ratio                    15     
                0-20                

 

 Serum or plasma creatinine measurement with calculation of estimated 
glomerular filtration rate                    >                     NRG        
        

 

 Serum or plasma glucose measurement (mass/volume)                    95 mg/dL 
                                   

 

 Serum or plasma calcium measurement (mass/volume)                    9.1 mg/dL
                    8.5-10.1                

 

 Serum or plasma total bilirubin measurement (mass/volume)                    
0.6 mg/dL                    0.1-1.0                

 

 Serum or plasma alkaline phosphatase measurement (enzymatic activity/volume)  
                  44 U/L                                    

 

 Serum or plasma aspartate aminotransferase measurement (enzymatic activity/
volume)                    17 U/L                    5-34                

 

 Serum or plasma alanine aminotransferase measurement (enzymatic activity/volume
)                    11 U/L                    0-55                

 

 Serum or plasma protein measurement (mass/volume)                    6.8 g/dL 
                   6.4-8.2                

 

 Serum or plasma albumin measurement (mass/volume)                    4.2 g/dL 
                   3.2-4.5                



                                                                               
                                                                               
                                                            



Encounters

                      





 ACCT No.                    Visit Date/Time                    Discharge      
              Status                    Pt. Type                    Provider   
                 Facility                    Loc./Unit                    
Complaint                

 

 [id number]                    2017 21:15:00                    2017 17:55:00                    DIS                    Inpatient              
      MAYNOR CASTRO DO                    [facility]                    4TH                    SYNCOPE,CLOSED HEAD INJ,
FACIAL ABRASIONS                

 

 [id number]                    2015 15:51:00                    2015 23:59:59                    [facility]S                    Outpatient             
       SEUN HERNADEZ ARNP                    [facility]                    RAD                    DUB                

 

 [id number]                    10/17/2014 15:06:00                    10/17/
2014 23:59:59                    [facility]                    Outpatient             
       DAVINA MURRAY FNP                    [facility]                    RAD                    GLOBUS SENSATION           
     

 

 [id number]                    2013 12:36:00                    2013 23:59:59                    [facility]                    Outpatient             
       HOMAR MONZON                    [facility]                    RAD                    FOLLOW-UP                

 

 [id number]                    2013 14:47:00                    2013 23:59:59                    [facility]S                    Outpatient             
       MAYNOR CASTRO DO                    [facility]                    RAD                    SCREENING                

 

 [id number]                    2017 08:04:00                           
              ACT                    Outpatient                    ASIF CARSON ARNP                    [facility]             
       RAD                    NEW ONSET HTN                

 

 [id number]                    2017 14:15:00                           
              ACT                    Outpatient                    ASIF CARSON ARNP                    [facility]             
       RAD                    NEW ONSET HTN                

 

 [id number]                    2010 08:52:00                           
                                   Document Registration

## 2017-09-15 LAB
ALBUMIN SERPL-MCNC: 4.4 GM/DL (ref 3.2–4.5)
ALT SERPL-CCNC: 12 U/L (ref 0–55)
ANION GAP SERPL CALC-SCNC: 9 MMOL/L (ref 5–14)
AST SERPL-CCNC: 19 U/L (ref 5–34)
BASOPHILS # BLD AUTO: 0 10^3/UL (ref 0–0.1)
BASOPHILS NFR BLD AUTO: 0 % (ref 0–10)
BILIRUB SERPL-MCNC: 0.5 MG/DL (ref 0.1–1)
BUN SERPL-MCNC: 13 MG/DL (ref 7–18)
BUN/CREAT SERPL: 17
CALCIUM SERPL-MCNC: 9.2 MG/DL (ref 8.5–10.1)
CHLORIDE SERPL-SCNC: 107 MMOL/L (ref 98–107)
CO2 SERPL-SCNC: 24 MMOL/L (ref 21–32)
CREAT SERPL-MCNC: 0.75 MG/DL (ref 0.6–1.3)
EOSINOPHIL # BLD AUTO: 0.1 10^3/UL (ref 0–0.3)
EOSINOPHIL NFR BLD AUTO: 1 % (ref 0–10)
ERYTHROCYTE [DISTWIDTH] IN BLOOD BY AUTOMATED COUNT: 12.6 % (ref 10–14.5)
ERYTHROCYTE [SEDIMENTATION RATE] IN BLOOD: 13 MM/HR (ref 0–20)
GFR SERPLBLD BASED ON 1.73 SQ M-ARVRAT: > 60 ML/MIN
GLUCOSE SERPL-MCNC: 88 MG/DL (ref 70–105)
LYMPHOCYTES # BLD AUTO: 3.1 X 10^3 (ref 1–4)
LYMPHOCYTES NFR BLD AUTO: 40 % (ref 12–44)
MAGNESIUM SERPL-MCNC: 2.2 MG/DL (ref 1.8–2.4)
MCH RBC QN AUTO: 30 PG (ref 25–34)
MCHC RBC AUTO-ENTMCNC: 33 G/DL (ref 32–36)
MCV RBC AUTO: 90 FL (ref 80–99)
MONOCYTES # BLD AUTO: 0.6 X 10^3 (ref 0–1)
MONOCYTES NFR BLD AUTO: 7 % (ref 0–12)
NEUTROPHILS # BLD AUTO: 4 X 10^3 (ref 1.8–7.8)
NEUTROPHILS NFR BLD AUTO: 52 % (ref 42–75)
PLATELET # BLD: 214 10^3/UL (ref 130–400)
PMV BLD AUTO: 11.8 FL (ref 7.4–10.4)
POTASSIUM SERPL-SCNC: 3.9 MMOL/L (ref 3.6–5)
PROT SERPL-MCNC: 7.4 GM/DL (ref 6.4–8.2)
RBC # BLD AUTO: 4.51 10^6/UL (ref 4.35–5.85)
SODIUM SERPL-SCNC: 140 MMOL/L (ref 135–145)
TSH SERPL-ACNC: 0.67 UIU/ML (ref 0.35–4.94)
WBC # BLD AUTO: 7.7 10^3/UL (ref 4.3–11)

## 2017-09-18 ENCOUNTER — HOSPITAL ENCOUNTER (OUTPATIENT)
Dept: HOSPITAL 75 - LAB | Age: 47
Discharge: HOME | End: 2017-09-18
Attending: INTERNAL MEDICINE
Payer: COMMERCIAL

## 2017-09-18 DIAGNOSIS — I73.89: ICD-10-CM

## 2017-09-18 DIAGNOSIS — R55: ICD-10-CM

## 2017-09-18 DIAGNOSIS — I10: Primary | ICD-10-CM

## 2017-09-18 PROCEDURE — 83735 ASSAY OF MAGNESIUM: CPT

## 2017-09-18 PROCEDURE — 84443 ASSAY THYROID STIM HORMONE: CPT

## 2017-09-18 PROCEDURE — 84244 ASSAY OF RENIN: CPT

## 2017-09-18 PROCEDURE — 85025 COMPLETE CBC W/AUTO DIFF WBC: CPT

## 2017-09-18 PROCEDURE — 82088 ASSAY OF ALDOSTERONE: CPT

## 2017-09-18 PROCEDURE — 85652 RBC SED RATE AUTOMATED: CPT

## 2017-09-18 PROCEDURE — 80053 COMPREHEN METABOLIC PANEL: CPT

## 2017-09-18 PROCEDURE — 84585 ASSAY OF URINE VMA: CPT

## 2017-09-18 PROCEDURE — 84156 ASSAY OF PROTEIN URINE: CPT

## 2017-09-18 PROCEDURE — 82530 CORTISOL FREE: CPT

## 2017-09-18 PROCEDURE — 36415 COLL VENOUS BLD VENIPUNCTURE: CPT

## 2017-09-18 PROCEDURE — 83497 ASSAY OF 5-HIAA: CPT

## 2017-09-18 PROCEDURE — 82384 ASSAY THREE CATECHOLAMINES: CPT

## 2017-09-19 LAB
Lab: 1800 ML
Lab: 1800 ML
Lab: 24 HOURS
Lab: 24 HOURS
SPECIMEN VOL 24H UR: 1800 ML

## 2017-09-20 LAB
Lab: 4.4 RATIO (ref ?–25)
RENIN PLAS-CCNC: 7.3 NG/ML/HR

## 2017-09-22 LAB
5OH-INDOLEACETATE UR-MCNC: 2.2 MG/L
CREAT 24H UR-MCNC: 57 MG/DL
Lab: (no result) UG/G CRT (ref 0–45)
Lab: 1026 MG/D (ref 700–1600)
Lab: 1026 MG/DAY (ref 700–1600)
Lab: 170 UG/G CRT (ref 0–250)
Lab: 2 UG/DAY (ref 1–7)
Lab: 34.91 UG/G CRT
Lab: 4 MG/GCR (ref 0–14)
Lab: 4 MG/GCR (ref 0–6)
Lab: 57 MG/DL
NOREPINEPH UR-MCNC: 26 UG/L
SPECIMEN SOURCE: 19.9 UG/L
VMA 24H UR-MRATE: 2.1 MG/L

## 2018-12-19 ENCOUNTER — HOSPITAL ENCOUNTER (OUTPATIENT)
Dept: HOSPITAL 75 - RAD | Age: 48
End: 2018-12-19
Attending: FAMILY MEDICINE
Payer: COMMERCIAL

## 2018-12-19 DIAGNOSIS — S09.90XA: Primary | ICD-10-CM
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
## 2018-12-19 DIAGNOSIS — I10: ICD-10-CM

## 2018-12-19 PROCEDURE — 70450 CT HEAD/BRAIN W/O DYE: CPT

## 2018-12-19 NOTE — DIAGNOSTIC IMAGING REPORT
INDICATION: Fall down stairs. Trauma to the head.



TECHNIQUE: Routine non contrast-enhanced axial images were

obtained from the skull base to the vertex.



COMPARISON: 06/18/2017



FINDINGS: The ventricles and cortical sulci are normal in size

and contour. There is no midline shift or mass-effect. No acute

intra-axial hemorrhage is seen. There are no abnormal areas of

increased or decreased density to suggest acute hemorrhage or

edema. No extra-axial masses or collections are present. The bony

calvarium is intact. The visualized paranasal sinuses are

unremarkable. The mastoid air cells are clear.



IMPRESSION:  

1. No acute intracranial abnormality. No CT evidence of mass,

acute infarct or intracranial hemorrhage.



Dictated by: 



  Dictated on workstation # [id number]

## 2019-08-01 ENCOUNTER — HOSPITAL ENCOUNTER (OUTPATIENT)
Dept: HOSPITAL 75 - RAD | Age: 49
End: 2019-08-01
Attending: NURSE PRACTITIONER
Payer: COMMERCIAL

## 2019-08-01 DIAGNOSIS — N63.20: Primary | ICD-10-CM

## 2019-08-01 DIAGNOSIS — R22.9: ICD-10-CM

## 2019-08-01 PROCEDURE — 76642 ULTRASOUND BREAST LIMITED: CPT

## 2019-08-01 PROCEDURE — 77066 DX MAMMO INCL CAD BI: CPT

## 2019-08-01 NOTE — DIAGNOSTIC IMAGING REPORT
Indication: Palpable lumps in the left breast.



Correlation made with diagnostic mammogram earlier same day.



Sonographic interrogation of the area of palpable abnormality in

the left axilla was performed. There is a hypoechoic mass just

below the skin surface measuring 8 mm x 4 mm x 7 mm. This most

likely represents a sebaceous cyst. In addition, area of palpable

 abnormality in the 12 o'clock location of the left breast was

evaluated. No sonographic abnormality is seen. No solid or cystic

mass is seen.



Impression: BI-RADS 2



Probable sebaceous cyst at the area of palpable abnormality in

the left axilla. No other significant abnormality is seen.



ACR BI-RADS Category 2: Benign findings.

Result letter will be mailed to the patient.

Note: At least 10% of breast cancer is not imaged by mammography.



Dictated by: 



  Dictated on workstation # [id number]

## 2019-08-01 NOTE — DIAGNOSTIC IMAGING REPORT
Indication: Palpable lumps in the left breast



Correlation is made with prior mammogram from 04/29/2013 and

07/23/2010.



2-D and 3-D bilateral diagnostic mammography was performed with

CAD.



BB markers are placed at the area of palpable abnormality in the

left breast.  This corresponds to the 12 o'clock location left

breast as well as the left axilla.



Both breasts remain heterogeneously dense, limiting sensitivity

of mammography. No dominant mass or malignant-appearing

microcalcifications are seen. Axillary unremarkable. Area of a

palpable abnormality in the left axilla was not included on these

radiographs due to the very high nature of the abnormality.



Impression:  No mammographic features suspicious for malignancy

are identified. Even so, directed sonographic interrogation of

the areas of palpable abnormality left breast is recommended and

will be performed today.



Dictated by: 



  Dictated on workstation # [id number]

## 2020-01-22 ENCOUNTER — HOSPITAL ENCOUNTER (OUTPATIENT)
Dept: HOSPITAL 75 - RAD | Age: 50
End: 2020-01-22
Attending: NURSE PRACTITIONER
Payer: COMMERCIAL

## 2020-01-22 DIAGNOSIS — N92.6: Primary | ICD-10-CM

## 2020-01-22 PROCEDURE — 76830 TRANSVAGINAL US NON-OB: CPT

## 2020-01-22 PROCEDURE — 76856 US EXAM PELVIC COMPLETE: CPT

## 2020-01-22 NOTE — DIAGNOSTIC IMAGING REPORT
PROCEDURE: US Non-ob pelvis comp/trans.



TECHNIQUE:  Multiple realtime grayscale images were obtained of

the pelvis in various projections endovaginally.



Transabdominal imaging was also performed.



INDICATION:  Heavy periods, clotting, pelvic pain.



COMPARISON: None



FINDINGS:



The uterus is normal in size. The endometrium is normal in

thickness measuring 5 mm. No uterine masses are seen. No free

fluid is seen. The ovaries are not seen bilaterally, which

appears to be due to bowel gas.



IMPRESSION:

1. No uterine masses or endometrial thickening is seen. The

ovaries are not seen due to bowel gas.



Dictated by: 



  Dictated on workstation # [id number]

## 2021-06-22 ENCOUNTER — HOSPITAL ENCOUNTER (OUTPATIENT)
Dept: HOSPITAL 75 - CARD | Age: 51
End: 2021-06-22
Attending: FAMILY MEDICINE
Payer: COMMERCIAL

## 2021-06-22 DIAGNOSIS — R00.2: Primary | ICD-10-CM

## 2021-06-22 PROCEDURE — 93005 ELECTROCARDIOGRAM TRACING: CPT

## 2021-12-22 ENCOUNTER — HOSPITAL ENCOUNTER (OUTPATIENT)
Dept: HOSPITAL 75 - RAD | Age: 51
End: 2021-12-22
Attending: FAMILY MEDICINE
Payer: COMMERCIAL

## 2021-12-22 DIAGNOSIS — M47.816: Primary | ICD-10-CM

## 2021-12-22 DIAGNOSIS — M51.36: ICD-10-CM

## 2021-12-22 DIAGNOSIS — R51.9: ICD-10-CM

## 2021-12-22 DIAGNOSIS — R41.3: ICD-10-CM

## 2021-12-22 DIAGNOSIS — M43.16: ICD-10-CM

## 2021-12-22 PROCEDURE — 72100 X-RAY EXAM L-S SPINE 2/3 VWS: CPT

## 2021-12-22 PROCEDURE — 70450 CT HEAD/BRAIN W/O DYE: CPT

## 2021-12-22 NOTE — DIAGNOSTIC IMAGING REPORT
EXAMINATION: CT head without contrast.



TECHNIQUE: Multiple contiguous axial images were obtained through

the brain without the use of intravenous contrast. All CT scans

use one or more of the following dose optimizing techniques:

automated exposure control, MA and/or KvP adjustment based on

patient size and exam type or iterative reconstruction. 



HISTORY: CEPHALGIA, MEMORY LOSS



COMPARISON: None available.



FINDINGS: 



The ventricles and sulci are normal. No abnormal attenuation of

brain parenchyma is present. No acute intracranial hemorrhage or

abnormal extra-axial fluid collections are present. 



No hyperdense vessel.



The calvarium is intact. The mastoid air cells are clear. The

visualized paranasal sinuses are clear. The orbits are normal.



IMPRESSION:



1. No acute intracranial abnormality.



Dictated by: 



  Dictated on workstation # [id number]

## 2021-12-22 NOTE — DIAGNOSTIC IMAGING REPORT
INDICATION: 

Chronic low back pain.



FINDINGS:

Three views of the lumbosacral spine show normal height of the

vertebral bodies. There is mild disc space narrowing at L3-L4 and

L4-L5 with mild spondylosis. There is 1 to 2 mm of

spondylolisthesis at L4-L5. There are degenerated facets

progressively worse down to L5-S1. No fracture or other acute

abnormality is seen.



IMPRESSION: 

There is mild degenerative disc disease and mild to moderate

degenerative facet disease with a grade 1 spondylolisthesis at

L4-L5. No acute abnormality is seen.



Dictated by: 



  Dictated on workstation # [id number]

## 2022-02-08 ENCOUNTER — HOSPITAL ENCOUNTER (OUTPATIENT)
Dept: HOSPITAL 75 - LABNPT | Age: 52
End: 2022-02-08
Attending: FAMILY MEDICINE
Payer: COMMERCIAL

## 2022-02-08 DIAGNOSIS — U07.1: Primary | ICD-10-CM

## 2022-02-08 PROCEDURE — 87635 SARS-COV-2 COVID-19 AMP PRB: CPT

## 2022-03-21 ENCOUNTER — HOSPITAL ENCOUNTER (OUTPATIENT)
Dept: HOSPITAL 75 - CARD | Age: 52
End: 2022-03-21
Attending: FAMILY MEDICINE
Payer: COMMERCIAL

## 2022-03-21 DIAGNOSIS — R70.0: ICD-10-CM

## 2022-03-21 DIAGNOSIS — R10.2: Primary | ICD-10-CM

## 2022-03-21 DIAGNOSIS — M89.9: ICD-10-CM

## 2022-03-21 PROCEDURE — 78306 BONE IMAGING WHOLE BODY: CPT

## 2022-03-21 NOTE — DIAGNOSTIC IMAGING REPORT
INDICATION: Pelvic pain with skull lesion. Elevated ESR.



TECHNIQUE: 24 mCi of technetium-99m MDP was given IV. Three-hour

delayed whole body imaging.



FINDINGS: Good uptake is noted. There is mild increased uptake in

the posterior elements in the lower lumbosacral spine. There is

also subtle uptake along the patellofemoral joint on the left. No

calvarial lesions are seen.



IMPRESSION: No changes are seen to suggest metastatic disease or

pathologic fractures. Findings consistent with degenerative

changes in the lumbosacral spine and left patellofemoral joint.



Dictated by: 



  Dictated on workstation # [id number]

## 2023-06-01 ENCOUNTER — HOSPITAL ENCOUNTER (OUTPATIENT)
Dept: HOSPITAL 75 - RAD | Age: 53
End: 2023-06-01
Attending: FAMILY MEDICINE
Payer: COMMERCIAL

## 2023-06-01 DIAGNOSIS — Z12.31: Primary | ICD-10-CM

## 2023-06-01 PROCEDURE — 77067 SCR MAMMO BI INCL CAD: CPT

## 2023-06-01 PROCEDURE — 77063 BREAST TOMOSYNTHESIS BI: CPT

## 2023-06-01 NOTE — DIAGNOSTIC IMAGING REPORT
INDICATION: Routine screening.



Comparison is made with prior mammogram from 08/01/2019.



2-D and 3-D bilateral screening mammography was performed with

CAD.



CAD is utilized.



The current study was also evaluated with a Computer Aided

Detection (CAD) system.



Both breasts are heterogeneously dense, limiting the sensitivity

of mammography. The parenchymal pattern is stable. No mass or

malignant-appearing microcalcifications are seen. There are

benign calcifications. Axillae are unremarkable.



IMPRESSION: BI-RADS Category 2



No mammographic features suspicious for malignancy are

identified.



ACR BI-RADS Category 2: Benign findings.

Result letter will be mailed to the patient.

Note: At least 10% of breast cancer is not imaged by mammography.



Dictated by: 



  Dictated on workstation # [id number]

## 2023-06-15 ENCOUNTER — HOSPITAL ENCOUNTER (OUTPATIENT)
Dept: HOSPITAL 75 - PREOP | Age: 53
LOS: 4 days | Discharge: HOME | End: 2023-06-19
Attending: INTERNAL MEDICINE
Payer: COMMERCIAL

## 2023-06-15 VITALS — BODY MASS INDEX: 28.77 KG/M2 | HEIGHT: 68.58 IN | WEIGHT: 192.02 LBS

## 2023-06-15 DIAGNOSIS — Z01.818: Primary | ICD-10-CM

## 2023-06-16 NOTE — HISTORY AND PHYSICAL
COLONOSCOPY HISTORY AND PHYSICAL



HISTORY OF PRESENT ILLNESS:

The patient is a 52-year-old white female referred by Dr. Linda for her first 

screening colonoscopy.  She is deemed to be of average risk and she is not aware

of any family history for colon cancer.  Denies abdominal pain, change in bowel 

habit, bright red blood per rectum, melena or change in weight.



PAST MEDICAL HISTORY:

Significant for hypertension.



MEDICATIONS:

On admission include metoprolol XL 50 mg daily, amlodipine 5 mg b.i.d., 

nortriptyline 2 at bedtime for insomnia, B12 and vitamin D supplements, unknown 

dose.



PAST SURGICAL HISTORY:

The patient reports no past surgeries.



FAMILY HISTORY:

Mother is still living at the age of 80, has a history of COPD, recently 

diagnosed thyroid cancer requiring thyroidectomy.  Father  of complications 

of congestive heart failure at age 76.  Has one brother 59, who is alive and 

well.



SOCIAL HISTORY:

The patient is employed.  Quit smoking over 12 years ago, 20-pack-year history. 

Small volume alcohol intake, one to two drinks twice a week as I recall.



REVIEW OF SYSTEMS:

CONSTITUTIONAL:  Denies night sweats, chills, fever or change in weight.

GASTROINTESTINAL:  As noted in the HPI.

PULMONARY:  Denies cough, wheezing or shortness of breath.

CARDIOVASCULAR:  Denies orthopnea, PND, pedal edema, syncope or chest 

discomfort.



PHYSICAL EXAMINATION:

GENERAL:  Reveals a pleasant white female, appears to be in no acute distress.

VITAL SIGNS:  Weight 192 pounds, blood pressure 114/62.

HEENT:  Unremarkable.  No evidence for pallor.  Sclerae nonicteric.

CHEST:  Clear to auscultation.

CARDIOVASCULAR:  Reveals a regular rate and rhythm without murmur, S3, or S4.

ABDOMEN:  Soft, supple without mass, organomegaly, or tenderness.

EXTREMITIES:  Revealed no cyanosis, clubbing or edema.



ASSESSMENT AND PLAN:

The patient is being set up for screening colonoscopy, deemed to be of average 

risk.  Her first.  Prep instructions were given and questions were answered.



I thank you for the referral of this pleasant lady.





Job ID: [id number]

DocumentID: [id number]

Dictated Date: 2023 17:07:23

Transcription Date: 2023 17:34:00

Dictated By: SAI CHANG MD

## 2023-06-23 ENCOUNTER — HOSPITAL ENCOUNTER (OUTPATIENT)
Dept: HOSPITAL 75 - ENDO | Age: 53
Discharge: HOME | End: 2023-06-23
Attending: INTERNAL MEDICINE
Payer: COMMERCIAL

## 2023-06-23 VITALS — SYSTOLIC BLOOD PRESSURE: 119 MMHG | DIASTOLIC BLOOD PRESSURE: 63 MMHG

## 2023-06-23 VITALS — BODY MASS INDEX: 28.77 KG/M2 | WEIGHT: 192.02 LBS | HEIGHT: 68.5 IN

## 2023-06-23 VITALS — DIASTOLIC BLOOD PRESSURE: 90 MMHG | SYSTOLIC BLOOD PRESSURE: 148 MMHG

## 2023-06-23 DIAGNOSIS — G47.00: ICD-10-CM

## 2023-06-23 DIAGNOSIS — K57.30: ICD-10-CM

## 2023-06-23 DIAGNOSIS — C73: ICD-10-CM

## 2023-06-23 DIAGNOSIS — Z87.891: ICD-10-CM

## 2023-06-23 DIAGNOSIS — Z12.11: Primary | ICD-10-CM

## 2023-06-23 DIAGNOSIS — Z79.899: ICD-10-CM

## 2023-06-23 DIAGNOSIS — K62.1: ICD-10-CM

## 2023-06-23 PROCEDURE — 84703 CHORIONIC GONADOTROPIN ASSAY: CPT

## 2023-06-23 NOTE — PRE-OP NOTE & CONSCIOUS SEDAT
Pre-Operative Progress Note


Date H&P Reviewed:  Jun 23, 2023


Time H&P Reviewed:  08:56


History & Physical:  H&P Reviewed, Patient Examed, No changes noted


Pre-Op Diagnosis:  screening





Moderate Sedation PreProcedure


ASA Score


2














Airway 


 


Lungs 


 


Heart 


 


 ASA score


 


 ASA 1: a normal healthy patient


 


 ASA 2:  a patient with a mild systemic disease (mid diabetes, controlled 

hypertension, obesity 


 


 ASA 3:  a patient with a severe systemic disease that limits activity  (angina,

COPD, prior Myocardial infarction)


 


 ASA 4:  a patient with an incapacitating disease that is a constant threat to 

life (CHF, renal failure)


 


 ASA 5:  a moribund patient not expected to survive 24 hrs.  (ruptured aneurysm)


 


 ASA 6:  a declared brain-dead patient whose organs are being harvested.


 


 For emergent operations, add the letter E after the classification











Mallampati Classification


Grade 1





Sedation Plan


Analgesia, Amnesia, Plan communicated to team members, Discussed options with 

patient/fam, Discussed risks with patient/fam


The patient is an appropriate candidate to undergo the planned procedure, 

sedation, and anesthesia.





The patient immediately re-assessed prior to indication.











SAI CHANG MD              Jun 23, 2023 08:56

## 2023-06-23 NOTE — PROGRESS NOTE-POST OPERATIVE
Post-Procedure Note


Physician (s)/Assistant (s)


Physician


SAI CHANG MD





Pre-Procedure Diagnosis


Pre-Procedure Diagnosis:  screening





Post-Procedure Diagnosis


Post-operative diagnosis:  


Prior to undergoing colonoscopy digital rectal evaluation was performed.


Anal sphincter tone was normal and the perianal reflexes intact.  No


abnormalities noted on digital inspection of the anal canal or distal


rectal vault.  The colonoscope was then inserted into the rectum and under


direct visualization advanced to the cecum.  The cecum was identified by


identification of the ileocecal valve and the cecal strap.  Photographic


documentation was obtained.  Careful inspection was made as the


colonoscope withdrawn.  Quality the prep was good.





Findings:





There are no evidence for internal or external hemorrhoids.  Present the


proximal rectum was a 2 mm sessile hyperplastic appearing polyp was


photographed and biopsied and ablated with hot forceps with no subsequent


blood loss.  The sigmoid colon save for one small diverticulum was


unremarkable.  The descending colon splenic flexure transverse colon


hepatic flexure ascending colon and cecum were unremarkable as well.





1 diminutive polyp was removed via hot forceps from the proximal rectum


and one small sigmoid diverticulum was noted.  This is otherwise normal


colonoscopy to cecum.  Would advocate consideration for repeat screening


colonoscopy in 10 years.  I thank you for the furl this pleasant lady


sincerely Sai Chang MD.





CC: SAI Hanson MD              Jun 23, 2023 09:55

## 2023-06-23 NOTE — ANESTHESIA-GENERAL POST-OP
MAC


Patient Condition


Mental Status/LOC:  Same as Preop


Cardiovascular:  Satisfactory


Nausea/Vomiting:  Absent


Respiratory:  Satisfactory


Pain:  Controlled


Complications:  Absent





Post Op Complications


Complications


None





Follow Up Care/Instructions


Patient Instructions


None needed.





Anesthesiology Discharge Order


Discharge Order


Patient is doing well, no complaints, stable vital signs, no apparent adverse 

anesthesia problems.   


No complications reported per nursing.











DYAN ALICIA CRNA              Jun 23, 2023 09:57

## 2023-09-12 ENCOUNTER — HOSPITAL ENCOUNTER (OUTPATIENT)
Dept: HOSPITAL 75 - RAD | Age: 53
End: 2023-09-12
Attending: FAMILY MEDICINE
Payer: SELF-PAY

## 2023-09-12 DIAGNOSIS — E78.00: ICD-10-CM

## 2023-09-12 DIAGNOSIS — Z82.49: ICD-10-CM

## 2023-09-12 DIAGNOSIS — I10: Primary | ICD-10-CM

## 2023-09-12 PROCEDURE — 75571 CT HRT W/O DYE W/CA TEST: CPT

## 2023-09-12 NOTE — DIAGNOSTIC IMAGING REPORT
INDICATION: Hypertension, family history of heart disease and

hypercholesterolemia



CT coronary calcium study performed with noncontrast images of

the heart followed by calculation of cardiac score. Dose

reduction protocol was used.



Visualized portion of the mediastinum shows no adenopathy. There

is no pleural fluid. The visualized portions of the lungs show no

focal infiltrates.



There were no significant coronary artery calcifications

visualized in any territory.



IMPRESSION:



Coronary calcium score was 0, no evidence of coronary

calcification.



Dictated by: 



  Dictated on workstation # [id number]